# Patient Record
Sex: FEMALE
[De-identification: names, ages, dates, MRNs, and addresses within clinical notes are randomized per-mention and may not be internally consistent; named-entity substitution may affect disease eponyms.]

---

## 2020-05-16 ENCOUNTER — TRANSCRIPTION ENCOUNTER (OUTPATIENT)
Age: 60
End: 2020-05-16

## 2023-10-15 PROBLEM — Z00.00 ENCOUNTER FOR PREVENTIVE HEALTH EXAMINATION: Status: ACTIVE | Noted: 2023-10-15

## 2023-10-17 ENCOUNTER — APPOINTMENT (OUTPATIENT)
Dept: ORTHOPEDIC SURGERY | Facility: CLINIC | Age: 63
End: 2023-10-17
Payer: COMMERCIAL

## 2023-10-17 VITALS — BODY MASS INDEX: 21.69 KG/M2 | WEIGHT: 135 LBS | HEIGHT: 66 IN

## 2023-10-17 DIAGNOSIS — M76.32 ILIOTIBIAL BAND SYNDROME, LEFT LEG: ICD-10-CM

## 2023-10-17 DIAGNOSIS — M48.061 SPINAL STENOSIS, LUMBAR REGION WITHOUT NEUROGENIC CLAUDICATION: ICD-10-CM

## 2023-10-17 PROCEDURE — 73503 X-RAY EXAM HIP UNI 4/> VIEWS: CPT | Mod: LT

## 2023-10-17 PROCEDURE — 99213 OFFICE O/P EST LOW 20 MIN: CPT

## 2023-12-04 ENCOUNTER — APPOINTMENT (OUTPATIENT)
Dept: ORTHOPEDIC SURGERY | Facility: CLINIC | Age: 63
End: 2023-12-04
Payer: COMMERCIAL

## 2023-12-04 VITALS — HEIGHT: 66 IN | WEIGHT: 135 LBS | BODY MASS INDEX: 21.69 KG/M2

## 2023-12-04 DIAGNOSIS — M25.579 PAIN IN UNSPECIFIED ANKLE AND JOINTS OF UNSPECIFIED FOOT: ICD-10-CM

## 2023-12-04 DIAGNOSIS — G57.52 TARSAL TUNNEL SYNDROME, LEFT LOWER LIMB: ICD-10-CM

## 2023-12-04 PROCEDURE — 73610 X-RAY EXAM OF ANKLE: CPT | Mod: LT

## 2023-12-04 PROCEDURE — 99214 OFFICE O/P EST MOD 30 MIN: CPT

## 2023-12-19 ENCOUNTER — TRANSCRIPTION ENCOUNTER (OUTPATIENT)
Age: 63
End: 2023-12-19

## 2023-12-27 ENCOUNTER — APPOINTMENT (OUTPATIENT)
Dept: COLORECTAL SURGERY | Facility: CLINIC | Age: 63
End: 2023-12-27
Payer: COMMERCIAL

## 2023-12-27 VITALS
WEIGHT: 135 LBS | RESPIRATION RATE: 16 BRPM | HEART RATE: 86 BPM | BODY MASS INDEX: 21.69 KG/M2 | TEMPERATURE: 99 F | SYSTOLIC BLOOD PRESSURE: 152 MMHG | DIASTOLIC BLOOD PRESSURE: 90 MMHG | HEIGHT: 66 IN | OXYGEN SATURATION: 99 %

## 2023-12-27 DIAGNOSIS — Z80.0 FAMILY HISTORY OF MALIGNANT NEOPLASM OF DIGESTIVE ORGANS: ICD-10-CM

## 2023-12-27 DIAGNOSIS — Z78.9 OTHER SPECIFIED HEALTH STATUS: ICD-10-CM

## 2023-12-27 DIAGNOSIS — K64.9 UNSPECIFIED HEMORRHOIDS: ICD-10-CM

## 2023-12-27 DIAGNOSIS — K60.2 ANAL FISSURE, UNSPECIFIED: ICD-10-CM

## 2023-12-27 PROCEDURE — 46600 DIAGNOSTIC ANOSCOPY SPX: CPT

## 2023-12-27 PROCEDURE — 99204 OFFICE O/P NEW MOD 45 MIN: CPT | Mod: 25

## 2023-12-27 NOTE — PHYSICAL EXAM
[Normal Breath Sounds] : Normal breath sounds [Normal Heart Sounds] : normal heart sounds [Normal Rate and Rhythm] : normal rate and rhythm [No Rash or Lesion] : No rash or lesion [Alert] : alert [Oriented to Person] : oriented to person [Oriented to Place] : oriented to place [Oriented to Time] : oriented to time [Calm] : calm [Abdomen Masses] : No abdominal masses [Abdomen Tenderness] : ~T No ~M abdominal tenderness [Normal rectal exam] : exam was normal [Posterior] : posteriorly [Excoriation] : no perianal excoriation [Multiple Sinus Tracts] : no perianal sinus tracts [Fistula] : no fistulas [Wart] : no warts [Ulcer ___ cm] : no ulcers [Pilonidal Cyst] : no pilonidal cysts [Pilonidal Sinus] : no pilonidal sinus [Pilonidal Sinus Draining] : no pilonidal sinus drainage [Nonprolapsing] : a nonprolapsing (grade I) [Tender, Swollen] : nontender, non-swollen [Thrombosed] : that was not thrombosed [Skin Tags] : residual hemorrhoidal skin tags were noted [Normal] : was normal [JVD] : no jugular venous distention  [Wheezing] : no wheezing was heard [de-identified] : benign [de-identified] : posterior midline chronic anal fissure (small) [de-identified] : healthy, well nourished [de-identified] : NC/AT [de-identified] : supple

## 2023-12-27 NOTE — ASSESSMENT
[FreeTextEntry1] : 63-year-old female with recurrent/persistent posterior midline fissure after undergoing Botox denervation of internal sphincter as well as lateral sphincterotomy..  The patient is compliant with fiber supplementation.  I agree with her original surgeon that an examination under anesthesia possible repeat lateral internal sphincterotomy is appropriate at this time. I did caution the patient about the increased risk of incontinence.  Followup with original surgeon or scheduled with me if she chooses.

## 2023-12-27 NOTE — REVIEW OF SYSTEMS
[Arthralgias] : arthralgias [Negative] : Heme/Lymph [Abdominal Pain] : no abdominal pain [Vomiting] : no vomiting [Constipation] : no constipation [Diarrhea] : no diarrhea [Joint Swelling] : no joint swelling [Joint Stiffness] : no joint stiffness [Confused] : no confusion [Convulsions] : no convulsions [Limb Weakness] : no limb weakness [Difficulty Walking] : no difficulty walking [FreeTextEntry3] : wears eyeglasses [FreeTextEntry9] : hx spinal stenosis, LLE numbness

## 2023-12-27 NOTE — HISTORY OF PRESENT ILLNESS
[FreeTextEntry1] : 64 y/o female with history of anal fissures and hemorrhoids presents today for initial consultation.  They started about 6 years ago, and was being treated by a different physician and received laser treatments with some improvement of symptoms for several months, and then the fissure would reoccur.  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  She switched physicians at that time and started to see Dr. Richar Christina at [facility].  In June 2022- she had Botox injections, fissurotomy, but symptoms recurred several months later.  In December 2022 she underwent partial lateral sphincterotomy was done.   She continues to complain of recurrent fissures for which she was treated with Nifedipine.  She also has hx of thrombosed hemorrhoids.   She takes Colace, Metamucil, Mag Oxide, high fiber diet, stays hydrated and reports daily formed bowel movements, but despite doing all this, she feels some sphincter tightness and notices some slight bleeding on toilet tissue. After bowel movements the pain lasts for hours and she uses Sitz baths.   She was recommended to have surgery again by Dr. Lau, and is here for a second opinion.   Last colonoscopy was Oct 2021, and no polyps were removed.

## 2024-01-15 ENCOUNTER — APPOINTMENT (OUTPATIENT)
Dept: ORTHOPEDIC SURGERY | Facility: CLINIC | Age: 64
End: 2024-01-15

## 2024-10-17 ENCOUNTER — NON-APPOINTMENT (OUTPATIENT)
Age: 64
End: 2024-10-17

## 2024-10-17 ENCOUNTER — APPOINTMENT (OUTPATIENT)
Dept: SPINE | Facility: CLINIC | Age: 64
End: 2024-10-17
Payer: COMMERCIAL

## 2024-10-17 VITALS
DIASTOLIC BLOOD PRESSURE: 77 MMHG | SYSTOLIC BLOOD PRESSURE: 146 MMHG | HEART RATE: 58 BPM | HEIGHT: 66 IN | WEIGHT: 135 LBS | BODY MASS INDEX: 21.69 KG/M2 | OXYGEN SATURATION: 99 %

## 2024-10-17 DIAGNOSIS — M48.062 SPINAL STENOSIS, LUMBAR REGION WITH NEUROGENIC CLAUDICATION: ICD-10-CM

## 2024-10-17 PROCEDURE — 99203 OFFICE O/P NEW LOW 30 MIN: CPT

## 2024-10-18 ENCOUNTER — OUTPATIENT (OUTPATIENT)
Dept: OUTPATIENT SERVICES | Facility: HOSPITAL | Age: 64
LOS: 1 days | End: 2024-10-18
Payer: COMMERCIAL

## 2024-10-18 VITALS
SYSTOLIC BLOOD PRESSURE: 120 MMHG | TEMPERATURE: 98 F | WEIGHT: 134.48 LBS | RESPIRATION RATE: 16 BRPM | HEART RATE: 65 BPM | HEIGHT: 66 IN | DIASTOLIC BLOOD PRESSURE: 80 MMHG | OXYGEN SATURATION: 99 %

## 2024-10-18 DIAGNOSIS — Z98.890 OTHER SPECIFIED POSTPROCEDURAL STATES: Chronic | ICD-10-CM

## 2024-10-18 DIAGNOSIS — Z29.9 ENCOUNTER FOR PROPHYLACTIC MEASURES, UNSPECIFIED: ICD-10-CM

## 2024-10-18 DIAGNOSIS — C54.1 MALIGNANT NEOPLASM OF ENDOMETRIUM: Chronic | ICD-10-CM

## 2024-10-18 DIAGNOSIS — Z01.818 ENCOUNTER FOR OTHER PREPROCEDURAL EXAMINATION: ICD-10-CM

## 2024-10-18 DIAGNOSIS — M48.061 SPINAL STENOSIS, LUMBAR REGION WITHOUT NEUROGENIC CLAUDICATION: ICD-10-CM

## 2024-10-18 LAB
A1C WITH ESTIMATED AVERAGE GLUCOSE RESULT: 4.3 % — SIGNIFICANT CHANGE UP (ref 4–5.6)
ALBUMIN SERPL ELPH-MCNC: 4.2 G/DL — SIGNIFICANT CHANGE UP (ref 3.3–5.2)
ALP SERPL-CCNC: 64 U/L — SIGNIFICANT CHANGE UP (ref 40–120)
ALT FLD-CCNC: 11 U/L — SIGNIFICANT CHANGE UP
ANION GAP SERPL CALC-SCNC: 9 MMOL/L — SIGNIFICANT CHANGE UP (ref 5–17)
APTT BLD: 31.4 SEC — SIGNIFICANT CHANGE UP (ref 24.5–35.6)
AST SERPL-CCNC: 21 U/L — SIGNIFICANT CHANGE UP
BASOPHILS # BLD AUTO: 0.08 K/UL — SIGNIFICANT CHANGE UP (ref 0–0.2)
BASOPHILS NFR BLD AUTO: 1.1 % — SIGNIFICANT CHANGE UP (ref 0–2)
BILIRUB SERPL-MCNC: 0.4 MG/DL — SIGNIFICANT CHANGE UP (ref 0.4–2)
BLD GP AB SCN SERPL QL: SIGNIFICANT CHANGE UP
BUN SERPL-MCNC: 22.3 MG/DL — HIGH (ref 8–20)
CALCIUM SERPL-MCNC: 9.9 MG/DL — SIGNIFICANT CHANGE UP (ref 8.4–10.5)
CHLORIDE SERPL-SCNC: 100 MMOL/L — SIGNIFICANT CHANGE UP (ref 96–108)
CO2 SERPL-SCNC: 29 MMOL/L — SIGNIFICANT CHANGE UP (ref 22–29)
CREAT SERPL-MCNC: 0.67 MG/DL — SIGNIFICANT CHANGE UP (ref 0.5–1.3)
EGFR: 99 ML/MIN/1.73M2 — SIGNIFICANT CHANGE UP
EOSINOPHIL # BLD AUTO: 0.27 K/UL — SIGNIFICANT CHANGE UP (ref 0–0.5)
EOSINOPHIL NFR BLD AUTO: 3.7 % — SIGNIFICANT CHANGE UP (ref 0–6)
ESTIMATED AVERAGE GLUCOSE: 77 MG/DL — SIGNIFICANT CHANGE UP (ref 68–114)
GLUCOSE SERPL-MCNC: 83 MG/DL — SIGNIFICANT CHANGE UP (ref 70–99)
HCT VFR BLD CALC: 36.6 % — SIGNIFICANT CHANGE UP (ref 34.5–45)
HGB BLD-MCNC: 11.3 G/DL — LOW (ref 11.5–15.5)
IMM GRANULOCYTES NFR BLD AUTO: 0.3 % — SIGNIFICANT CHANGE UP (ref 0–0.9)
INR BLD: 1.13 RATIO — SIGNIFICANT CHANGE UP (ref 0.85–1.16)
LYMPHOCYTES # BLD AUTO: 1.37 K/UL — SIGNIFICANT CHANGE UP (ref 1–3.3)
LYMPHOCYTES # BLD AUTO: 18.7 % — SIGNIFICANT CHANGE UP (ref 13–44)
MCHC RBC-ENTMCNC: 29 PG — SIGNIFICANT CHANGE UP (ref 27–34)
MCHC RBC-ENTMCNC: 30.9 GM/DL — LOW (ref 32–36)
MCV RBC AUTO: 94.1 FL — SIGNIFICANT CHANGE UP (ref 80–100)
MONOCYTES # BLD AUTO: 0.54 K/UL — SIGNIFICANT CHANGE UP (ref 0–0.9)
MONOCYTES NFR BLD AUTO: 7.4 % — SIGNIFICANT CHANGE UP (ref 2–14)
MRSA PCR RESULT.: SIGNIFICANT CHANGE UP
NEUTROPHILS # BLD AUTO: 5.03 K/UL — SIGNIFICANT CHANGE UP (ref 1.8–7.4)
NEUTROPHILS NFR BLD AUTO: 68.8 % — SIGNIFICANT CHANGE UP (ref 43–77)
PLATELET # BLD AUTO: 274 K/UL — SIGNIFICANT CHANGE UP (ref 150–400)
POTASSIUM SERPL-MCNC: 5.1 MMOL/L — SIGNIFICANT CHANGE UP (ref 3.5–5.3)
POTASSIUM SERPL-SCNC: 5.1 MMOL/L — SIGNIFICANT CHANGE UP (ref 3.5–5.3)
PROT SERPL-MCNC: 6.7 G/DL — SIGNIFICANT CHANGE UP (ref 6.6–8.7)
PROTHROM AB SERPL-ACNC: 12.8 SEC — SIGNIFICANT CHANGE UP (ref 9.9–13.4)
RBC # BLD: 3.89 M/UL — SIGNIFICANT CHANGE UP (ref 3.8–5.2)
RBC # FLD: 12.6 % — SIGNIFICANT CHANGE UP (ref 10.3–14.5)
S AUREUS DNA NOSE QL NAA+PROBE: SIGNIFICANT CHANGE UP
SODIUM SERPL-SCNC: 138 MMOL/L — SIGNIFICANT CHANGE UP (ref 135–145)
WBC # BLD: 7.31 K/UL — SIGNIFICANT CHANGE UP (ref 3.8–10.5)
WBC # FLD AUTO: 7.31 K/UL — SIGNIFICANT CHANGE UP (ref 3.8–10.5)

## 2024-10-18 PROCEDURE — 85610 PROTHROMBIN TIME: CPT

## 2024-10-18 PROCEDURE — 93010 ELECTROCARDIOGRAM REPORT: CPT

## 2024-10-18 PROCEDURE — 83036 HEMOGLOBIN GLYCOSYLATED A1C: CPT

## 2024-10-18 PROCEDURE — 71046 X-RAY EXAM CHEST 2 VIEWS: CPT

## 2024-10-18 PROCEDURE — 87641 MR-STAPH DNA AMP PROBE: CPT

## 2024-10-18 PROCEDURE — G0463: CPT

## 2024-10-18 PROCEDURE — 87640 STAPH A DNA AMP PROBE: CPT

## 2024-10-18 PROCEDURE — 80053 COMPREHEN METABOLIC PANEL: CPT

## 2024-10-18 PROCEDURE — 71046 X-RAY EXAM CHEST 2 VIEWS: CPT | Mod: 26

## 2024-10-18 PROCEDURE — 36415 COLL VENOUS BLD VENIPUNCTURE: CPT

## 2024-10-18 PROCEDURE — 85730 THROMBOPLASTIN TIME PARTIAL: CPT

## 2024-10-18 PROCEDURE — 86901 BLOOD TYPING SEROLOGIC RH(D): CPT

## 2024-10-18 PROCEDURE — 93005 ELECTROCARDIOGRAM TRACING: CPT

## 2024-10-18 PROCEDURE — 86850 RBC ANTIBODY SCREEN: CPT

## 2024-10-18 PROCEDURE — 85025 COMPLETE CBC W/AUTO DIFF WBC: CPT

## 2024-10-18 PROCEDURE — 86900 BLOOD TYPING SEROLOGIC ABO: CPT

## 2024-10-18 NOTE — H&P PST ADULT - ASSESSMENT
CAPRINI SCORE    AGE RELATED RISK FACTORS                                                             [ ] Age 41-60 years                                            (1 Point)  [ ] Age: 61-74 years                                           (2 Points)                 [ ] Age= 75 years                                                (3 Points)             DISEASE RELATED RISK FACTORS                                                       [ ] Edema in the lower extremities                 (1 Point)                     [ ] Varicose veins                                               (1 Point)                                 [ ] BMI > 25 Kg/m2                                            (1 Point)                                  [ ] Serious infection (ie PNA)                            (1 Point)                     [ ] Lung disease ( COPD, Emphysema)            (1 Point)                                                                          [ ] Acute myocardial infarction                         (1 Point)                  [ ] Congestive heart failure (in the previous month)  (1 Point)         [ ] Inflammatory bowel disease                            (1 Point)                  [ ] Central venous access, PICC or Port               (2 points)       (within the last month)                                                                [ ] Stroke (in the previous month)                        (5 Points)    [ ] Previous or present malignancy                       (2 points)                                                                                                                                                         HEMATOLOGY RELATED FACTORS                                                         [ ] Prior episodes of VTE                                     (3 Points)                     [ ] Positive family history for VTE                      (3 Points)                  [ ] Prothrombin 40175 A                                     (3 Points)                     [ ] Factor V Leiden                                                (3 Points)                        [ ] Lupus anticoagulants                                      (3 Points)                                                           [ ] Anticardiolipin antibodies                              (3 Points)                                                       [ ] High homocysteine in the blood                   (3 Points)                                             [ ] Other congenital or acquired thrombophilia      (3 Points)                                                [ ] Heparin induced thrombocytopenia                  (3 Points)                                        MOBILITY RELATED FACTORS  [ ] Bed rest                                                         (1 Point)  [ ] Plaster cast                                                    (2 points)  [ ] Bed bound for more than 72 hours           (2 Points)    GENDER SPECIFIC FACTORS  [ ] Pregnancy or had a baby within the last month   (1 Point)  [ ] Post-partum < 6 weeks                                   (1 Point)  [ ] Hormonal therapy  or oral contraception   (1 Point)  [ ] History of pregnancy complications              (1 point)  [ ] Unexplained or recurrent              (1 Point)    OTHER RISK FACTORS                                           (1 Point)  [ ] BMI >40, smoking, diabetes requiring insulin, chemotherapy  blood transfusions and length of surgery over 2 hours    SURGERY RELATED RISK FACTORS  [ ]  Section within the last month     (1 Point)  [ ] Minor surgery                                                  (1 Point)  [ ] Arthroscopic surgery                                       (2 Points)  [ ] Planned major surgery lasting more            (2 Points)      than 45 minutes     [ ] Elective hip or knee joint replacement       (5 points)       surgery                                                TRAUMA RELATED RISK FACTORS  [ ] Fracture of the hip, pelvis, or leg                       (5 Points)  [ ] Spinal cord injury resulting in paralysis             (5 points)       (in the previous month)    [ ] Paralysis  (less than 1 month)                             (5 Points)  [ ] Multiple Trauma within 1 month                        (5 Points)    Total Score [        ]    Caprini Score 0-2: Low Risk, NO VTE prophylaxis required for most patients, encourage ambulation  Caprini Score 3-6: Moderate Risk , pharmacologic VTE prophylaxis is indicated for most patients (in the absence of contraindications)  Caprini Score Greater than or =7: High risk, pharmocologic VTE prophylaxis indicated for most patients (in the absence of contraindications)    OPIOID RISK TOOL    JERRY EACH BOX THAT APPLIES AND ADD TOTALS AT THE END    FAMILY HISTORY OF SUBSTANCE ABUSE                 FEMALE         MALE                                                Alcohol                             [  ]1 pt          [  ]3pts                                               Illegal Durgs                     [  ]2 pts        [  ]3pts                                               Rx Drugs                           [  ]4 pts        [  ]4 pts    PERSONAL HISTORY OF SUBSTANCE ABUSE                                                                                          Alcohol                             [  ]3 pts       [  ]3 pts                                               Illegal Drugs                     [  ]4 pts        [  ]4 pts                                               Rx Drugs                           [  ]5 pts        [  ]5 pts    AGE BETWEEN 16-45 YEARS                                      [  ]1 pt         [  ]1 pt    HISTORY OF PREADOLESCENT   SEXUAL ABUSE                                                             [  ]3 pts        [  ]0pts    PSYCHOLOGICAL DISEASE                     ADD, OCD, Bipolar, Schizophrenia        [  ]2 pts         [  ]2 pts                      Depression                                               [  ]1 pt           [  ]1 pt           SCORING TOTAL   (0)                                     A score of 3 or lower indicated LOW risk for future opioid abuse  A score of 4 to 7 indicated moderate risk for future opioid abuse  A score of 8 or higher indicates a high risk for opioid abuse                               Pt is a very pleasant 63 y/o female, PMH asthma as a child, anemia, presents to PST with c/o lumbar spondylolisthesis.  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  Has worked with PT, f/u with neuro for EMGs, acupuncture - all w/o relief.  Pt verbalizing wishes for surgical intervention, f/u with surgeon, recent imagining reviewed, sent for additional CT scan, now scheduled for surgery.  Pt reports feeling generally well otherwise, denies recent fever/cough/cold, infection or abx use.  Denies bowel/bladder dysfunction.  Scheduled for L4-L5 Posterior Lumbar Interbody Fusion on 2024 with Dr. Ac, pending medical clearance.     CAPRINI SCORE    AGE RELATED RISK FACTORS                                                             [ ] Age 41-60 years                                            (1 Point)  [ X] Age: 61-74 years                                           (2 Points)                 [ ] Age= 75 years                                                (3 Points)             DISEASE RELATED RISK FACTORS                                                       [ ] Edema in the lower extremities                 (1 Point)                     [ X] Varicose veins                                               (1 Point)                                 [ ] BMI > 25 Kg/m2                                            (1 Point)                                  [ ] Serious infection (ie PNA)                            (1 Point)                     [ ] Lung disease ( COPD, Emphysema)            (1 Point)                                                                          [ ] Acute myocardial infarction                         (1 Point)                  [ ] Congestive heart failure (in the previous month)  (1 Point)         [ ] Inflammatory bowel disease                            (1 Point)                  [ ] Central venous access, PICC or Port               (2 points)       (within the last month)                                                                [ ] Stroke (in the previous month)                        (5 Points)    [ ] Previous or present malignancy                       (2 points)                                                                                                                                                         HEMATOLOGY RELATED FACTORS                                                         [ ] Prior episodes of VTE                                     (3 Points)                     [ ] Positive family history for VTE                      (3 Points)                  [ ] Prothrombin 23186 A                                     (3 Points)                     [ ] Factor V Leiden                                                (3 Points)                        [ ] Lupus anticoagulants                                      (3 Points)                                                           [ ] Anticardiolipin antibodies                              (3 Points)                                                       [ ] High homocysteine in the blood                   (3 Points)                                             [ ] Other congenital or acquired thrombophilia      (3 Points)                                                [ ] Heparin induced thrombocytopenia                  (3 Points)                                        MOBILITY RELATED FACTORS  [ ] Bed rest                                                         (1 Point)  [ ] Plaster cast                                                    (2 points)  [ ] Bed bound for more than 72 hours           (2 Points)    GENDER SPECIFIC FACTORS  [ ] Pregnancy or had a baby within the last month   (1 Point)  [ ] Post-partum < 6 weeks                                   (1 Point)  [ ] Hormonal therapy  or oral contraception   (1 Point)  [ ] History of pregnancy complications              (1 point)  [ ] Unexplained or recurrent              (1 Point)    OTHER RISK FACTORS                                           (1 Point)  [ ] BMI >40, smoking, diabetes requiring insulin, chemotherapy  blood transfusions and length of surgery over 2 hours    SURGERY RELATED RISK FACTORS  [ ]  Section within the last month     (1 Point)  [ ] Minor surgery                                                  (1 Point)  [ ] Arthroscopic surgery                                       (2 Points)  [X ] Planned major surgery lasting more            (2 Points)      than 45 minutes     [ ] Elective hip or knee joint replacement       (5 points)       surgery                                                TRAUMA RELATED RISK FACTORS  [ ] Fracture of the hip, pelvis, or leg                       (5 Points)  [ ] Spinal cord injury resulting in paralysis             (5 points)       (in the previous month)    [ ] Paralysis  (less than 1 month)                             (5 Points)  [ ] Multiple Trauma within 1 month                        (5 Points)    Total Score [    5    ]    Caprini Score 0-2: Low Risk, NO VTE prophylaxis required for most patients, encourage ambulation  Caprini Score 3-6: Moderate Risk , pharmacologic VTE prophylaxis is indicated for most patients (in the absence of contraindications)  Caprini Score Greater than or =7: High risk, pharmocologic VTE prophylaxis indicated for most patients (in the absence of contraindications)    OPIOID RISK TOOL    JERRY EACH BOX THAT APPLIES AND ADD TOTALS AT THE END    FAMILY HISTORY OF SUBSTANCE ABUSE                 FEMALE         MALE                                                Alcohol                             [  ]1 pt          [  ]3pts                                               Illegal Durgs                     [  ]2 pts        [  ]3pts                                               Rx Drugs                           [  ]4 pts        [  ]4 pts    PERSONAL HISTORY OF SUBSTANCE ABUSE                                                                                          Alcohol                             [  ]3 pts       [  ]3 pts                                               Illegal Drugs                     [  ]4 pts        [  ]4 pts                                               Rx Drugs                           [  ]5 pts        [  ]5 pts    AGE BETWEEN 16-45 YEARS                                      [  ]1 pt         [  ]1 pt    HISTORY OF PREADOLESCENT   SEXUAL ABUSE                                                             [  ]3 pts        [  ]0pts    PSYCHOLOGICAL DISEASE                     ADD, OCD, Bipolar, Schizophrenia        [  ]2 pts         [  ]2 pts                      Depression                                               [  ]1 pt           [  ]1 pt           SCORING TOTAL   (0)                                     A score of 3 or lower indicated LOW risk for future opioid abuse  A score of 4 to 7 indicated moderate risk for future opioid abuse  A score of 8 or higher indicates a high risk for opioid abuse

## 2024-10-18 NOTE — H&P PST ADULT - PROBLEM SELECTOR PLAN 2
Total Score [        ]    Caprini Score 0-2: Low Risk, NO VTE prophylaxis required for most patients, encourage ambulation  Caprini Score 3-6: Moderate Risk , pharmacologic VTE prophylaxis is indicated for most patients (in the absence of contraindications)  Caprini Score Greater than or =7: High risk, pharmocologic VTE prophylaxis indicated for most patients (in the absence of contraindications) Total Score [   5     ]    Caprini Score 3-6: Moderate Risk , pharmacologic VTE prophylaxis is indicated for most patients (in the absence of contraindications)

## 2024-10-18 NOTE — H&P PST ADULT - HISTORY OF PRESENT ILLNESS
Pt is a pleasant 63 y/o female, PMH mild asthma, anemia, presents to PST with c/o   lumbar spondylolisthesis Mercedez explains experiencing progressively worsening b/l LE      Pt reports feeling generally well, denies recent fever/cough/cold, infection or abx use.  Scheduled for L4-L5 Posterior Lumbar Interbody Fusion on 11.4.2024 with Dr. Ac, pending medical clearance.  Pt is a pleasant 65 y/o female, PMH mild asthma, anemia, presents to PST with c/o lumbar spondylolisthesis.  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  Has f/u with PT, neuro for EMG, acupuncture all w/o relief.  Pt verbalizing wishes for surgical intervention f/u with surgeon, recent imagining reviewed, sent for additional CT scan, now scheduled for surgery.  Pt reports feeling generally well, denies recent fever/cough/cold, infection or abx use.  Scheduled for L4-L5 Posterior Lumbar Interbody Fusion on 11.4.2024 with Dr. Ac, pending medical clearance.  Pt is a very pleasant 63 y/o female, PMH asthma as a child, anemia, presents to PST with c/o lumbar spondylolisthesis.  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  Has worked with PT, f/u with neuro for EMGs, acupuncture - all w/o relief.  Pt verbalizing wishes for surgical intervention, f/u with surgeon, recent imagining reviewed, sent for additional CT scan, now scheduled for surgery.  Pt reports feeling generally well otherwise, denies recent fever/cough/cold, infection or abx use.  Denies bowel/bladder dysfunction.  Scheduled for L4-L5 Posterior Lumbar Interbody Fusion on 11.4.2024 with Dr. Ac, pending medical clearance.

## 2024-10-18 NOTE — H&P PST ADULT - MUSCULOSKELETAL COMMENTS
see HPI generalized antalgic gait noted, RLE with bandage/wrap from ankle to upper thigh from recent varicose vein ligation, dressing c/d/i, no swelling

## 2024-10-18 NOTE — H&P PST ADULT - NSICDXPASTMEDICALHX_GEN_ALL_CORE_FT
PAST MEDICAL HISTORY:  Anemia     Asthma     Displacement of lumbar intervertebral disc     Lumbar stenosis     Spondylolisthesis

## 2024-10-18 NOTE — H&P PST ADULT - PROBLEM SELECTOR PLAN 1
Total Score [        ]    Caprini Score 0-2: Low Risk, NO VTE prophylaxis required for most patients, encourage ambulation  Caprini Score 3-6: Moderate Risk , pharmacologic VTE prophylaxis is indicated for most patients (in the absence of contraindications)  Caprini Score Greater than or =7: High risk, pharmocologic VTE prophylaxis indicated for most patients (in the absence of contraindications) EKG, labs, CXR, MRSA cultures pending  Patient educated on surgical scrub, COVID testing, ERP, preadmission instructions, medical clearance and day of procedure medications, verbalizes understanding.   Pt instructed to stop vitamins/supplements/herbal medications/ASA/NSAIDS for one week prior to surgery and discuss with PMD.   Written & verbal instructions provided to pt for all education/instructions, questions encouraged/addressed, pt verbalized understandings of all education/instructions, teach back method utilized Scheduled for L4-L5 Posterior Lumbar Interbody Fusion on 11.4.2024 with Dr. Ac, pending medical clearance.   EKG, labs, CXR, MRSA cultures pending  Patient educated on surgical scrub, COVID testing, ERP, preadmission instructions, medical clearance and day of procedure medications, verbalizes understanding.   Pt instructed to stop vitamins/supplements/herbal medications/ASA/NSAIDS for one week prior to surgery and discuss with PMD.   Written & verbal instructions provided to pt for all education/instructions, questions encouraged/addressed, pt verbalized understandings of all education/instructions, teach back method utilized

## 2024-10-18 NOTE — H&P PST ADULT - MUSCULOSKELETAL
normal/ROM intact/normal gait/strength 5/5 bilateral upper extremities/strength 5/5 bilateral lower extremities details… ROM intact/strength 5/5 bilateral upper extremities/strength 5/5 bilateral lower extremities no calf tenderness/erythema/edema/ROM intact/no calf tenderness/strength 5/5 bilateral upper extremities/strength 5/5 bilateral lower extremities

## 2024-10-29 NOTE — PROVIDER CONTACT NOTE (OTHER) - ACTION/TREATMENT ORDERED:
Attended spine education class with opportunity to ask questions. Contact information for follow up questions given  on 10/23

## 2024-11-01 RX ORDER — POVIDONE-IODINE 0.07 MG/ML
1 SOLUTION TOPICAL ONCE
Refills: 0 | Status: DISCONTINUED | OUTPATIENT
Start: 2024-11-04 | End: 2024-11-04

## 2024-11-04 ENCOUNTER — INPATIENT (INPATIENT)
Facility: HOSPITAL | Age: 64
LOS: 3 days | Discharge: HOME | DRG: 402 | End: 2024-11-08
Attending: NEUROLOGICAL SURGERY | Admitting: NEUROLOGICAL SURGERY
Payer: COMMERCIAL

## 2024-11-04 VITALS
RESPIRATION RATE: 16 BRPM | DIASTOLIC BLOOD PRESSURE: 96 MMHG | TEMPERATURE: 98 F | SYSTOLIC BLOOD PRESSURE: 167 MMHG | WEIGHT: 134.92 LBS | HEART RATE: 77 BPM | HEIGHT: 66 IN | OXYGEN SATURATION: 100 %

## 2024-11-04 DIAGNOSIS — Z98.890 OTHER SPECIFIED POSTPROCEDURAL STATES: Chronic | ICD-10-CM

## 2024-11-04 DIAGNOSIS — M43.16 SPONDYLOLISTHESIS, LUMBAR REGION: ICD-10-CM

## 2024-11-04 DIAGNOSIS — M51.26 OTHER INTERVERTEBRAL DISC DISPLACEMENT, LUMBAR REGION: ICD-10-CM

## 2024-11-04 LAB
GLUCOSE BLDC GLUCOMTR-MCNC: 104 MG/DL — HIGH (ref 70–99)
GLUCOSE BLDC GLUCOMTR-MCNC: 69 MG/DL — LOW (ref 70–99)

## 2024-11-04 PROCEDURE — 99222 1ST HOSP IP/OBS MODERATE 55: CPT

## 2024-11-04 DEVICE — SPACER PROLIFT EXPAND POST 15 DEG 10X28X8-13MM: Type: IMPLANTABLE DEVICE | Status: FUNCTIONAL

## 2024-11-04 DEVICE — SCREW SERRATO POLY 7.5X50MM: Type: IMPLANTABLE DEVICE | Status: FUNCTIONAL

## 2024-11-04 DEVICE — SCREW BLOCKER BONE XIA: Type: IMPLANTABLE DEVICE | Status: FUNCTIONAL

## 2024-11-04 DEVICE — SCREW SERRATO POLY 7.5X45MM: Type: IMPLANTABLE DEVICE | Status: FUNCTIONAL

## 2024-11-04 DEVICE — SCREW POLY 6.5X50MM: Type: IMPLANTABLE DEVICE | Status: FUNCTIONAL

## 2024-11-04 DEVICE — SURGIFLO MATRIX WITH THROMBIN KIT: Type: IMPLANTABLE DEVICE | Status: FUNCTIONAL

## 2024-11-04 DEVICE — PUTTY SIGNAFUSE 15G: Type: IMPLANTABLE DEVICE | Status: FUNCTIONAL

## 2024-11-04 DEVICE — ROD RAD 45: Type: IMPLANTABLE DEVICE | Status: FUNCTIONAL

## 2024-11-04 RX ORDER — HYDROMORPHONE HCL/0.9% NACL/PF 6 MG/30 ML
0.25 PATIENT CONTROLLED ANALGESIA SYRINGE INTRAVENOUS
Refills: 0 | Status: DISCONTINUED | OUTPATIENT
Start: 2024-11-04 | End: 2024-11-04

## 2024-11-04 RX ORDER — SENNA 187 MG
2 TABLET ORAL AT BEDTIME
Refills: 0 | Status: DISCONTINUED | OUTPATIENT
Start: 2024-11-04 | End: 2024-11-08

## 2024-11-04 RX ORDER — ONDANSETRON HYDROCHLORIDE 2 MG/ML
4 INJECTION, SOLUTION INTRAMUSCULAR; INTRAVENOUS EVERY 6 HOURS
Refills: 0 | Status: DISCONTINUED | OUTPATIENT
Start: 2024-11-04 | End: 2024-11-08

## 2024-11-04 RX ORDER — CEFAZOLIN SODIUM 1 G
2000 VIAL (EA) INJECTION EVERY 8 HOURS
Refills: 0 | Status: DISCONTINUED | OUTPATIENT
Start: 2024-11-04 | End: 2024-11-07

## 2024-11-04 RX ORDER — GABAPENTIN 800 MG/1
1 TABLET, FILM COATED ORAL
Refills: 0 | DISCHARGE

## 2024-11-04 RX ORDER — POLYETHYLENE GLYCOL 3350 17 G/17G
17 POWDER, FOR SOLUTION ORAL AT BEDTIME
Refills: 0 | Status: DISCONTINUED | OUTPATIENT
Start: 2024-11-04 | End: 2024-11-08

## 2024-11-04 RX ORDER — ACETAMINOPHEN 500 MG
1000 TABLET ORAL ONCE
Refills: 0 | Status: COMPLETED | OUTPATIENT
Start: 2024-11-04 | End: 2024-11-04

## 2024-11-04 RX ORDER — POLYVINYL ALCOHOL 1 %
1 DROPS OPHTHALMIC (EYE)
Refills: 0 | DISCHARGE

## 2024-11-04 RX ORDER — CEFAZOLIN SODIUM 1 G
2000 VIAL (EA) INJECTION EVERY 8 HOURS
Refills: 0 | Status: DISCONTINUED | OUTPATIENT
Start: 2024-11-04 | End: 2024-11-04

## 2024-11-04 RX ORDER — ACETAMINOPHEN 500 MG
650 TABLET ORAL EVERY 6 HOURS
Refills: 0 | Status: DISCONTINUED | OUTPATIENT
Start: 2024-11-04 | End: 2024-11-05

## 2024-11-04 RX ORDER — CEFAZOLIN SODIUM 1 G
2000 VIAL (EA) INJECTION ONCE
Refills: 0 | Status: DISCONTINUED | OUTPATIENT
Start: 2024-11-04 | End: 2024-11-04

## 2024-11-04 RX ORDER — SODIUM CHLORIDE 9 MG/ML
3 INJECTION, SOLUTION INTRAMUSCULAR; INTRAVENOUS; SUBCUTANEOUS EVERY 8 HOURS
Refills: 0 | Status: DISCONTINUED | OUTPATIENT
Start: 2024-11-04 | End: 2024-11-04

## 2024-11-04 RX ORDER — ONDANSETRON HYDROCHLORIDE 2 MG/ML
4 INJECTION, SOLUTION INTRAMUSCULAR; INTRAVENOUS ONCE
Refills: 0 | Status: DISCONTINUED | OUTPATIENT
Start: 2024-11-04 | End: 2024-11-04

## 2024-11-04 RX ORDER — NALOXONE HYDROCHLORIDE 1 MG/ML
0.1 INJECTION, SOLUTION INTRAMUSCULAR; INTRAVENOUS; SUBCUTANEOUS
Refills: 0 | Status: DISCONTINUED | OUTPATIENT
Start: 2024-11-04 | End: 2024-11-08

## 2024-11-04 RX ORDER — HYDROMORPHONE HCL/0.9% NACL/PF 6 MG/30 ML
30 PATIENT CONTROLLED ANALGESIA SYRINGE INTRAVENOUS
Refills: 0 | Status: DISCONTINUED | OUTPATIENT
Start: 2024-11-04 | End: 2024-11-05

## 2024-11-04 RX ORDER — FENTANYL CITRAT/DEXTROSE 5%/PF 1250MCG/50
25 PATIENT CONTROLLED ANALGESIA SYRINGE INTRAVENOUS
Refills: 0 | Status: DISCONTINUED | OUTPATIENT
Start: 2024-11-04 | End: 2024-11-04

## 2024-11-04 RX ORDER — GABAPENTIN 300 MG/1
300 CAPSULE ORAL THREE TIMES A DAY
Refills: 0 | Status: DISCONTINUED | OUTPATIENT
Start: 2024-11-04 | End: 2024-11-08

## 2024-11-04 RX ORDER — GABAPENTIN 300 MG/1
300 CAPSULE ORAL THREE TIMES A DAY
Refills: 0 | Status: CANCELLED | OUTPATIENT
Start: 2024-11-05 | End: 2024-11-04

## 2024-11-04 RX ORDER — SODIUM CHLORIDE 9 MG/ML
1000 INJECTION, SOLUTION INTRAMUSCULAR; INTRAVENOUS; SUBCUTANEOUS
Refills: 0 | Status: DISCONTINUED | OUTPATIENT
Start: 2024-11-04 | End: 2024-11-08

## 2024-11-04 RX ORDER — DIPHENHYDRAMINE HCL 12.5MG/5ML
50 ELIXIR ORAL EVERY 6 HOURS
Refills: 0 | Status: DISCONTINUED | OUTPATIENT
Start: 2024-11-05 | End: 2024-11-08

## 2024-11-04 RX ORDER — ENOXAPARIN SODIUM 40MG/0.4ML
40 SYRINGE (ML) SUBCUTANEOUS EVERY 24 HOURS
Refills: 0 | Status: DISCONTINUED | OUTPATIENT
Start: 2024-11-05 | End: 2024-11-08

## 2024-11-04 RX ADMIN — Medication 0.25 MILLIGRAM(S): at 12:40

## 2024-11-04 RX ADMIN — Medication 400 MILLIGRAM(S): at 12:40

## 2024-11-04 RX ADMIN — Medication 0.25 MILLIGRAM(S): at 12:50

## 2024-11-04 RX ADMIN — Medication 2 TABLET(S): at 22:01

## 2024-11-04 RX ADMIN — POVIDONE-IODINE 1 APPLICATION(S): 0.07 SOLUTION TOPICAL at 09:41

## 2024-11-04 RX ADMIN — Medication 30 MILLILITER(S): at 20:09

## 2024-11-04 RX ADMIN — Medication 1000 MILLIGRAM(S): at 13:00

## 2024-11-04 RX ADMIN — Medication 0.25 MILLIGRAM(S): at 13:00

## 2024-11-04 RX ADMIN — Medication 30 MILLILITER(S): at 16:53

## 2024-11-04 RX ADMIN — Medication 2000 MILLIGRAM(S): at 16:57

## 2024-11-04 RX ADMIN — GABAPENTIN 300 MILLIGRAM(S): 300 CAPSULE ORAL at 16:36

## 2024-11-04 RX ADMIN — Medication 30 MILLILITER(S): at 17:30

## 2024-11-04 RX ADMIN — Medication 30 MILLILITER(S): at 13:05

## 2024-11-04 RX ADMIN — POLYETHYLENE GLYCOL 3350 17 GRAM(S): 17 POWDER, FOR SOLUTION ORAL at 22:01

## 2024-11-04 RX ADMIN — GABAPENTIN 300 MILLIGRAM(S): 300 CAPSULE ORAL at 22:01

## 2024-11-04 NOTE — CONSULT NOTE ADULT - ASSESSMENT
63 y/o female with PMH asthma as a child (last asthma attack was 20years ago), anemia, presents with c/o lumbar spondylolisthesis.  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  Had worked with PT, f/u with neuro for EMGs, acupuncture - all w/o relief.  Patient verbalized wishes for surgical intervention, f/u with surgeon, recent imagining reviewed, sent for additional CT scan.  Denied bowel/bladder dysfunction.  Patient is now s/p PLIF L4-L5 POD#0.      Lumbar Spondylolisthesis  S/p PLIF L4-L5 POD#0.  Pain control - placed on PCA.  PT/OT.  Antibiotics, wound care, drain management and DVT prophylaxis as per Neurosurgery.  Incentive spirometry.  Avoid opioid induced constipation.    DVT prophylaxis   SCDs. 63 y/o female with PMH asthma as a child (last asthma attack was 20years ago), anemia, presents with c/o lumbar spondylolisthesis.  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  Had worked with PT, f/u with neuro for EMGs, acupuncture - all w/o relief.  Patient verbalized wishes for surgical intervention, f/u with surgeon, recent imagining reviewed, sent for additional CT scan.  Denied bowel/bladder dysfunction.  Patient is now s/p PLIF L4-L5 POD#0.      Lumbar Spondylolisthesis  S/p PLIF L4-L5 POD#0.  Pain control - placed on PCA.  PT/OT.  Antibiotics per SCIP, wound care, drain management and DVT prophylaxis as per Neurosurgery.  Incentive spirometry.  Avoid opioid induced constipation.    DVT prophylaxis   SCDs.

## 2024-11-04 NOTE — CHART NOTE - NSCHARTNOTEFT_GEN_A_CORE
Ms. Hernandez was measured for and fit with an LSO. The brace was set up to fit her anatomy. I created some vacancy along the mid to distal posterior panel of the brace to prevent direct pressure on the drain. The brace should not be worn directly against the skin. It should be worn over clothing. The brace is for out of bed use when  sitting in an upright position or while standing. It is not necessary while in bed unless specified by neurosurgery. Use and care were explained. Instructions and contact info provided.     JANET Barreto  [facility]  [phone]

## 2024-11-04 NOTE — CONSULT NOTE ADULT - NS ATTEND AMEND GEN_ALL_CORE FT
64 year old female with childhood asthma, anemia and spondylolisthesis presented with bilateral lower extremity pain and underwent L4-L5 PLIF today. Patient was interviewed and examined in PACU. Pain ~ 6. Bhatia in place, emptied. Denies any dizziness, nausea, chest pain, dyspnea. Intermittent right lower extremity numbness.  PMH - as noted above  PSH - fissure, varicose vein  Allergies - multiple antibiotics (rash)  Meds - Gabapentin, Colace  FH - Mother with breast and endometrial cancer  Social History - , lives with spouse, retired PT; denies any alcohol, smoking or drug use      OBJECTIVE:  Vital Signs Last 24 Hrs  T(C): 37 (04 Nov 2024 17:27), Max: 37 (04 Nov 2024 17:27)  T(F): 98.6 (04 Nov 2024 17:27), Max: 98.6 (04 Nov 2024 17:27)  HR: 79 (04 Nov 2024 17:27) (56 - 83)  BP: 104/61 (04 Nov 2024 17:27) (96/64 - 167/96)   RR: 18 (04 Nov 2024 17:27) (10 - 18)  SpO2: 96% (04 Nov 2024 17:27) (96% - 100%)    Parameters below as of 04 Nov 2024 17:27  Patient On (Oxygen Delivery Method): room air        PHYSICAL EXAMINATION  General: petite middle aged female lying comfortably in bed  HEENT:  pupils equal, responsive, reactive to light and accomodation, extraocular movements intact  NECK: Supple  CVS:  regular rate and rhythm S1 S2  RESP:  Clear to auscultation bilaterally  GI:  Soft nondistended nontender BS(+) sluggish  : Bhatia  MSK:  Moves all extremities. Lumbar dressing with PALLAVI drain  CNS:  Awake, alert, oriented, Fluent speech., Able to lift extremities off bed. intact sensation  INTEG:  Lumbar dressing  PSYCH:  Fair mood    Hgb 11.1    # Lumbar Spondylolisthesis s/p L4-L5 PLIF  # Anemia, normocytic    Recommendations as noted above.

## 2024-11-04 NOTE — PROGRESS NOTE ADULT - SUBJECTIVE AND OBJECTIVE BOX
***incomplete****  HPI:  Pt is a very pleasant 63 y/o female, PMH asthma as a child, anemia, presents to PST with c/o lumbar spondylolisthesis.  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  Has worked with PT, f/u with neuro for EMGs, acupuncture - all w/o relief.  Pt verbalizing wishes for surgical intervention, f/u with surgeon, recent imagining reviewed, sent for additional CT scan, now scheduled for surgery.  Pt reports feeling generally well otherwise, denies recent fever/cough/cold, infection or abx use.  Denies bowel/bladder dysfunction.  Scheduled for L4-L5 Posterior Lumbar Interbody Fusion on 11.4.2024  (18 Oct 2024 08:06)          Vital Signs Last 24 Hrs  T(C): 36.4 (04 Nov 2024 12:25), Max: 36.6 (04 Nov 2024 08:43)  T(F): 97.6 (04 Nov 2024 12:25), Max: 97.9 (04 Nov 2024 08:43)  HR: 60 (04 Nov 2024 14:00) (56 - 77)  BP: 112/65 (04 Nov 2024 14:00) (96/64 - 167/96)  BP(mean): --  RR: 10 (04 Nov 2024 14:00) (10 - 17)  SpO2: 97% (04 Nov 2024 14:00) (97% - 100%)    Parameters below as of 04 Nov 2024 13:00  Patient On (Oxygen Delivery Method): room air          PHYSICAL EXAM:  GENERAL: NAD, well-groomed, well-developed  HEAD:  Atraumatic, normocephalic  DRAINS:   WOUND: Dressing clean dry intact  MENTAL STATUS: AAO x3; Awake/Comatose; Opens eyes spontaneously/to voice/to light touch/to noxious stimuli; Appropriately conversant without aphasia/Nonverbal; following simple commands/mimicking/not following commands  CRANIAL NERVES: PERRL; EOMI; corneals intact b/l; Dolls sign positive; blinks to threat b/l; no facial asymmetry; facial sensation grossly intact to light touch b/l;  tongue midline; palate rises symmetrically; gag reflex intact  MOTOR: strength 5/5 B/L upper and lower extremities; sensation grossly intact all extremities; DTRs 2+ intact and symmetric; negative Dale's b/l; negative clonus b/l  CHEST/LUNG: Clear to auscultation bilaterally; no rales, rhonchi, wheezing, or rubs  HEART: +S1/+S2; Regular rate and rhythm; no murmurs, rubs, or gallops  ABDOMEN: Soft, nontender, nondistended; bowel sounds present all four quadrants  EXTREMITIES:  2+ peripheral pulses, no clubbing, cyanosis, or edema  SKIN: Warm, dry; no rashes or lesions      LABS:      11-04 @ 07:01  -  11-04 @ 14:33  --------------------------------------------------------  IN: 140 mL / OUT: 90 mL / NET: 50 mL     POST-OPERATIVE NOTE    Procedure: L4-5 PLIF    Diagnosis/Indication: lumbar stenosis    Surgeon: Dr. Ac    INTERVAL HPI/ACUTE EVENTS:  64yFemale PMH admitted with now s/p L4-5 PLIF POD#0. Patient seen lying comfortably in bed. Still having numbness/tingling BLE. Denies CP, SOB, MOSQUEDA, calf tenderness. Pain controlled with medication.    VITALS:  T(C): 36.7 (11-04-24 @ 14:30), Max: 36.7 (11-04-24 @ 14:30)  HR: 67 (11-04-24 @ 14:30) (56 - 77)  BP: 127/66 (11-04-24 @ 14:30) (96/64 - 167/96)  RR: 12 (11-04-24 @ 14:30) (10 - 17)  SpO2: 99% (11-04-24 @ 14:30) (97% - 100%)  Wt(kg): --    PHYSICAL EXAM:  GENERAL: NAD  DRAINS: Subfascial drain to bulb suction/thumbprint suction/gravity. Serosanguinous drainage noted.  WOUND: Dressing clean dry intact  FIORELLA COMA SCORE: E- V- M- = 15  MENTAL STATUS: AAO x3; Awake; Opens eyes spontaneously; Appropriately conversant without aphasia; following simple commands  CRANIAL NERVES: PERRL. EOMI without nystagmus. Face symmetric w/ normal eye closure and smile, tongue midline. Hearing grossly intact. Speech clear. Head turning and shoulder shrug intact.   MOTOR: strength 5/5 b/l upper and lower extremities  SENSATION: grossly intact to light touch all extremities  CHEST/LUNG: Nonlabored breathing on room air  SKIN: Warm, dry; no rashes or lesions

## 2024-11-04 NOTE — PHYSICAL THERAPY INITIAL EVALUATION ADULT - CRITERIA FOR SKILLED THERAPEUTIC INTERVENTIONS
Home with assist from family as needed, surgical follow up./impairments found/functional limitations in following categories/anticipated discharge recommendation

## 2024-11-04 NOTE — PATIENT PROFILE ADULT - FALL HARM RISK - HARM RISK INTERVENTIONS
Assistance with ambulation/Assistance OOB with selected safe patient handling equipment/Communicate Risk of Fall with Harm to all staff/Discuss with provider need for PT consult/Monitor gait and stability/Provide patient with walking aids - walker, cane, crutches/Reinforce activity limits and safety measures with patient and family/Sit up slowly, dangle for a short time, stand at bedside before walking/Tailored Fall Risk Interventions/Use of alarms - bed, chair and/or voice tab/Visual Cue: Yellow wristband and red socks/Bed in lowest position, wheels locked, appropriate side rails in place/Call bell, personal items and telephone in reach/Instruct patient to call for assistance before getting out of bed or chair/Non-slip footwear when patient is out of bed/[place] to call system/Physically safe environment - no spills, clutter or unnecessary equipment/Purposeful Proactive Rounding/Room/bathroom lighting operational, light cord in reach

## 2024-11-04 NOTE — PHYSICAL THERAPY INITIAL EVALUATION ADULT - GENERAL OBSERVATIONS, REHAB EVAL
Pt received reclining in bed, bedrails x 4, CBWR, IV, tele, , BP cuff, SCDs, and PALLAVI drain intact. Pt agreeable to PT evaluation.

## 2024-11-04 NOTE — PHYSICAL THERAPY INITIAL EVALUATION ADULT - ADDITIONAL COMMENTS
Pt reports she lives with her  and adult son in a private home with 1 step with no HR + 3 steps with 2 wide-apart handrails to enter.  Inside the house there are 5-6 steps with 1 HR up to pts bedroom and bathroom, also 5-6 steps with 1 HR down to pts den.  Pt reports her  is willing and able to assist her as needed upon DC.

## 2024-11-04 NOTE — PROGRESS NOTE ADULT - ASSESSMENT
65 y/o female, PMH asthma as a child, anemia, presents to PST with c/o lumbar spondylolisthesis. POD #0 L4-L5 Posterior Lumbar Interbody Fusion.     Plan:   Pain control, avoid oversedation. PCA pump with pain management  - 1 PALLAVI to full suction  - antibiotics while drain in place  - Bhatia in, TOV tomorrow  - pain management to follow for PCA management  - LSO when OOB   65 y/o female, PMH asthma as a child, anemia, presents to PST with c/o lumbar spondylolisthesis. POD #0 L4-L5 Posterior Lumbar Interbody Fusion.     Plan:   - Pain control: PCA pump with pain management, avoid oversedation  - 1 PALLAVI to full suction  - Antibiotics while drain in place  - Bhatia in, TOV tomorrow  - LSO when OOB   - Senna/Miralax to avoid opioid induced constipation  - Benadryl PRN due to patient's adhesive irritation]  - Case discussed with Dr. Ac.

## 2024-11-04 NOTE — BRIEF OPERATIVE NOTE - NSEVIDENCEINFORABS_GEN_ALL_CORE
NOTIFICATION RETURN TO WORK / SCHOOL 
 
3/27/2017 3:16 PM 
 
Ms. Bassam Tinoco [address] [place] [address] Dr Zoie Friedman [address] [place] [id number] To Whom It May Concern: 
 
Bassam Tinoco is currently under the care of [address] [place] [address] Moshe Drake. Please excuse her from work today 3-. She will return 3-28-17. If there are questions or concerns please have the patient contact our office.  
 
 
 
Sincerely, 
 
 
Kate Jc MD 
 
                                
 

No

## 2024-11-05 DIAGNOSIS — D62 ACUTE POSTHEMORRHAGIC ANEMIA: ICD-10-CM

## 2024-11-05 DIAGNOSIS — I95.81 POSTPROCEDURAL HYPOTENSION: ICD-10-CM

## 2024-11-05 LAB
ANION GAP SERPL CALC-SCNC: 8 MMOL/L — SIGNIFICANT CHANGE UP (ref 5–17)
BASOPHILS # BLD AUTO: 0.06 K/UL — SIGNIFICANT CHANGE UP (ref 0–0.2)
BASOPHILS # BLD AUTO: 0.09 K/UL — SIGNIFICANT CHANGE UP (ref 0–0.2)
BASOPHILS NFR BLD AUTO: 0.5 % — SIGNIFICANT CHANGE UP (ref 0–2)
BASOPHILS NFR BLD AUTO: 0.8 % — SIGNIFICANT CHANGE UP (ref 0–2)
BUN SERPL-MCNC: 11 MG/DL — SIGNIFICANT CHANGE UP (ref 8–20)
CALCIUM SERPL-MCNC: 8.3 MG/DL — LOW (ref 8.4–10.5)
CHLORIDE SERPL-SCNC: 107 MMOL/L — SIGNIFICANT CHANGE UP (ref 96–108)
CO2 SERPL-SCNC: 27 MMOL/L — SIGNIFICANT CHANGE UP (ref 22–29)
CREAT SERPL-MCNC: 0.63 MG/DL — SIGNIFICANT CHANGE UP (ref 0.5–1.3)
EGFR: 100 ML/MIN/1.73M2 — SIGNIFICANT CHANGE UP
EOSINOPHIL # BLD AUTO: 0.15 K/UL — SIGNIFICANT CHANGE UP (ref 0–0.5)
EOSINOPHIL # BLD AUTO: 0.15 K/UL — SIGNIFICANT CHANGE UP (ref 0–0.5)
EOSINOPHIL NFR BLD AUTO: 1.3 % — SIGNIFICANT CHANGE UP (ref 0–6)
EOSINOPHIL NFR BLD AUTO: 1.3 % — SIGNIFICANT CHANGE UP (ref 0–6)
FERRITIN SERPL-MCNC: 46 NG/ML — SIGNIFICANT CHANGE UP (ref 13–330)
GLUCOSE SERPL-MCNC: 91 MG/DL — SIGNIFICANT CHANGE UP (ref 70–99)
HCT VFR BLD CALC: 29.5 % — LOW (ref 34.5–45)
HCT VFR BLD CALC: 31.3 % — LOW (ref 34.5–45)
HGB BLD-MCNC: 9.2 G/DL — LOW (ref 11.5–15.5)
HGB BLD-MCNC: 9.9 G/DL — LOW (ref 11.5–15.5)
IMM GRANULOCYTES NFR BLD AUTO: 0.3 % — SIGNIFICANT CHANGE UP (ref 0–0.9)
IMM GRANULOCYTES NFR BLD AUTO: 0.3 % — SIGNIFICANT CHANGE UP (ref 0–0.9)
IRON SATN MFR SERPL: 12 % — LOW (ref 14–50)
IRON SATN MFR SERPL: 34 UG/DL — LOW (ref 37–145)
LYMPHOCYTES # BLD AUTO: 17.7 % — SIGNIFICANT CHANGE UP (ref 13–44)
LYMPHOCYTES # BLD AUTO: 19.7 % — SIGNIFICANT CHANGE UP (ref 13–44)
LYMPHOCYTES # BLD AUTO: 2.07 K/UL — SIGNIFICANT CHANGE UP (ref 1–3.3)
LYMPHOCYTES # BLD AUTO: 2.32 K/UL — SIGNIFICANT CHANGE UP (ref 1–3.3)
MCHC RBC-ENTMCNC: 28.5 PG — SIGNIFICANT CHANGE UP (ref 27–34)
MCHC RBC-ENTMCNC: 29.3 PG — SIGNIFICANT CHANGE UP (ref 27–34)
MCHC RBC-ENTMCNC: 31.2 G/DL — LOW (ref 32–36)
MCHC RBC-ENTMCNC: 31.6 G/DL — LOW (ref 32–36)
MCV RBC AUTO: 91.3 FL — SIGNIFICANT CHANGE UP (ref 80–100)
MCV RBC AUTO: 92.6 FL — SIGNIFICANT CHANGE UP (ref 80–100)
MONOCYTES # BLD AUTO: 0.77 K/UL — SIGNIFICANT CHANGE UP (ref 0–0.9)
MONOCYTES # BLD AUTO: 0.88 K/UL — SIGNIFICANT CHANGE UP (ref 0–0.9)
MONOCYTES NFR BLD AUTO: 6.6 % — SIGNIFICANT CHANGE UP (ref 2–14)
MONOCYTES NFR BLD AUTO: 7.5 % — SIGNIFICANT CHANGE UP (ref 2–14)
NEUTROPHILS # BLD AUTO: 8.31 K/UL — HIGH (ref 1.8–7.4)
NEUTROPHILS # BLD AUTO: 8.59 K/UL — HIGH (ref 1.8–7.4)
NEUTROPHILS NFR BLD AUTO: 70.7 % — SIGNIFICANT CHANGE UP (ref 43–77)
NEUTROPHILS NFR BLD AUTO: 73.3 % — SIGNIFICANT CHANGE UP (ref 43–77)
PLATELET # BLD AUTO: 206 K/UL — SIGNIFICANT CHANGE UP (ref 150–400)
PLATELET # BLD AUTO: 238 K/UL — SIGNIFICANT CHANGE UP (ref 150–400)
POTASSIUM SERPL-MCNC: 3.9 MMOL/L — SIGNIFICANT CHANGE UP (ref 3.5–5.3)
POTASSIUM SERPL-SCNC: 3.9 MMOL/L — SIGNIFICANT CHANGE UP (ref 3.5–5.3)
RBC # BLD: 3.23 M/UL — LOW (ref 3.8–5.2)
RBC # BLD: 3.38 M/UL — LOW (ref 3.8–5.2)
RBC # FLD: 12.5 % — SIGNIFICANT CHANGE UP (ref 10.3–14.5)
RBC # FLD: 12.6 % — SIGNIFICANT CHANGE UP (ref 10.3–14.5)
SODIUM SERPL-SCNC: 142 MMOL/L — SIGNIFICANT CHANGE UP (ref 135–145)
TIBC SERPL-MCNC: 286 UG/DL — SIGNIFICANT CHANGE UP (ref 220–430)
TRANSFERRIN SERPL-MCNC: 200 MG/DL — SIGNIFICANT CHANGE UP (ref 192–382)
WBC # BLD: 11.71 K/UL — HIGH (ref 3.8–10.5)
WBC # BLD: 11.76 K/UL — HIGH (ref 3.8–10.5)
WBC # FLD AUTO: 11.71 K/UL — HIGH (ref 3.8–10.5)
WBC # FLD AUTO: 11.76 K/UL — HIGH (ref 3.8–10.5)

## 2024-11-05 PROCEDURE — 99233 SBSQ HOSP IP/OBS HIGH 50: CPT

## 2024-11-05 RX ORDER — METHOCARBAMOL 500 MG/1
500 TABLET ORAL THREE TIMES A DAY
Refills: 0 | Status: DISCONTINUED | OUTPATIENT
Start: 2024-11-05 | End: 2024-11-08

## 2024-11-05 RX ORDER — SODIUM CHLORIDE 9 MG/ML
500 INJECTION, SOLUTION INTRAMUSCULAR; INTRAVENOUS; SUBCUTANEOUS ONCE
Refills: 0 | Status: COMPLETED | OUTPATIENT
Start: 2024-11-05 | End: 2024-11-05

## 2024-11-05 RX ORDER — OXYCODONE HYDROCHLORIDE 30 MG/1
10 TABLET ORAL EVERY 4 HOURS
Refills: 0 | Status: DISCONTINUED | OUTPATIENT
Start: 2024-11-05 | End: 2024-11-08

## 2024-11-05 RX ORDER — HYDROMORPHONE HCL/0.9% NACL/PF 6 MG/30 ML
0.5 PATIENT CONTROLLED ANALGESIA SYRINGE INTRAVENOUS EVERY 6 HOURS
Refills: 0 | Status: DISCONTINUED | OUTPATIENT
Start: 2024-11-05 | End: 2024-11-08

## 2024-11-05 RX ORDER — OXYCODONE HYDROCHLORIDE 30 MG/1
5 TABLET ORAL EVERY 4 HOURS
Refills: 0 | Status: DISCONTINUED | OUTPATIENT
Start: 2024-11-05 | End: 2024-11-08

## 2024-11-05 RX ORDER — ACETAMINOPHEN 500 MG
650 TABLET ORAL EVERY 6 HOURS
Refills: 0 | Status: DISCONTINUED | OUTPATIENT
Start: 2024-11-05 | End: 2024-11-08

## 2024-11-05 RX ORDER — FOLIC ACID 1 MG/1
1 TABLET ORAL DAILY
Refills: 0 | Status: DISCONTINUED | OUTPATIENT
Start: 2024-11-05 | End: 2024-11-08

## 2024-11-05 RX ORDER — PSYLLIUM SEED
1 POWDER (GRAM) ORAL THREE TIMES A DAY
Refills: 0 | Status: DISCONTINUED | OUTPATIENT
Start: 2024-11-05 | End: 2024-11-06

## 2024-11-05 RX ADMIN — GABAPENTIN 300 MILLIGRAM(S): 300 CAPSULE ORAL at 13:33

## 2024-11-05 RX ADMIN — Medication 30 MILLILITER(S): at 07:44

## 2024-11-05 RX ADMIN — Medication 40 MILLIGRAM(S): at 05:28

## 2024-11-05 RX ADMIN — Medication 2000 MILLIGRAM(S): at 05:28

## 2024-11-05 RX ADMIN — SODIUM CHLORIDE 1500 MILLILITER(S): 9 INJECTION, SOLUTION INTRAMUSCULAR; INTRAVENOUS; SUBCUTANEOUS at 15:36

## 2024-11-05 RX ADMIN — SODIUM CHLORIDE 2000 MILLILITER(S): 9 INJECTION, SOLUTION INTRAMUSCULAR; INTRAVENOUS; SUBCUTANEOUS at 11:31

## 2024-11-05 RX ADMIN — Medication 2000 MILLIGRAM(S): at 21:46

## 2024-11-05 RX ADMIN — POLYETHYLENE GLYCOL 3350 17 GRAM(S): 17 POWDER, FOR SOLUTION ORAL at 21:44

## 2024-11-05 RX ADMIN — Medication 650 MILLIGRAM(S): at 11:31

## 2024-11-05 RX ADMIN — GABAPENTIN 300 MILLIGRAM(S): 300 CAPSULE ORAL at 05:28

## 2024-11-05 RX ADMIN — Medication 650 MILLIGRAM(S): at 17:43

## 2024-11-05 RX ADMIN — GABAPENTIN 300 MILLIGRAM(S): 300 CAPSULE ORAL at 21:43

## 2024-11-05 RX ADMIN — SODIUM CHLORIDE 70 MILLILITER(S): 9 INJECTION, SOLUTION INTRAMUSCULAR; INTRAVENOUS; SUBCUTANEOUS at 16:46

## 2024-11-05 RX ADMIN — Medication 2 TABLET(S): at 21:42

## 2024-11-05 RX ADMIN — Medication 650 MILLIGRAM(S): at 12:31

## 2024-11-05 RX ADMIN — Medication 650 MILLIGRAM(S): at 23:53

## 2024-11-05 RX ADMIN — Medication 1 PACKET(S): at 21:43

## 2024-11-05 RX ADMIN — Medication 2000 MILLIGRAM(S): at 13:33

## 2024-11-05 RX ADMIN — Medication 650 MILLIGRAM(S): at 18:43

## 2024-11-05 NOTE — PROGRESS NOTE ADULT - SUBJECTIVE AND OBJECTIVE BOX
HPI:  Pt is a very pleasant 63 y/o female, PMH asthma as a child, anemia, presents to PST with c/o lumbar spondylolisthesis.  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  Has worked with PT, f/u with neuro for EMGs, acupuncture - all w/o relief.  Pt verbalizing wishes for surgical intervention, f/u with surgeon, recent imagining reviewed, sent for additional CT scan, now scheduled for surgery.  Pt reports feeling generally well otherwise, denies recent fever/cough/cold, infection or abx use. Denies bowel/bladder dysfunction. Scheduled for L4-L5 Posterior Lumbar Interbody Fusion on 11.4.2024 with Dr. Ac, pending medical clearance. (18 Oct 2024 08:06)    INTERVAL HPI/OVERNIGHT EVENTS:  64F with PMH of asthma as a child (last asthma attack was 20years ago), anemia, presents with c/o lumbar spondylolisthesis, s/p L4-L5 PLIF, POD#1, seen this morning lying comfortably in bed. Tolerating diet. Bhatia in with clear urine; TOV this AM. Patient c/o incisional back pain with continued R foot/ankle numbness since pre-op. Denies headache, weakness, n/v. 24hr drain OP: 335.   Of note, nurse called reporting a vaso-vagal episode this AM after walking with PT, patient became hypotensive, however resolved once back in bed. 1 NS 500cc bolus given.    Vital Signs Last 24 Hrs  T(C): 36.9 (05 Nov 2024 10:33), Max: 37 (04 Nov 2024 17:27)  T(F): 98.4 (05 Nov 2024 10:33), Max: 98.6 (04 Nov 2024 17:27)  HR: 68 (05 Nov 2024 10:33) (56 - 83)  BP: 99/61 (05 Nov 2024 10:33) (96/64 - 127/66)  BP(mean): --  RR: 18 (05 Nov 2024 10:33) (10 - 18)  SpO2: 91% (05 Nov 2024 10:33) (91% - 100%)    Parameters below as of 05 Nov 2024 10:33  Patient On (Oxygen Delivery Method): room air    PHYSICAL EXAM:  GENERAL: NAD, well-groomed  HEAD: Atraumatic, normocephalic  WOUND: Dressing clean dry intact, within minimal dried blood, no active bleeding  DRAIN: 1 PALLAVI subfascial drain to full suction. Serosanguinous drained noted.  FIORELLA COMA SCORE: E-4 V-5 M-6 = 15  MENTAL STATUS: AAO x3; Awake; Opens eyes spontaneously; Appropriately conversant without aphasia; following commands  CRANIAL NERVES: PERRL. Facial sensation intact V1-3 distribution b/l. Face symmetric w/ normal eye closure and smile, tongue midline. Hearing grossly intact. Speech clear.  MOTOR: strength 5/5 b/l upper and lower extremities  SENSATION: grossly intact to light touch all extremities  CHEST/LUNG: Nonlabored breathing  SKIN: Warm, dry    LABS:                        9.2    11.76 )-----------( 206      ( 05 Nov 2024 06:18 )             29.5     11-05    142  |  107  |  11.0  ----------------------------<  91  3.9   |  27.0  |  0.63    Ca    8.3[L]      05 Nov 2024 06:18    Urinalysis Basic - ( 05 Nov 2024 06:18 )    Color: x / Appearance: x / SG: x / pH: x  Gluc: 91 mg/dL / Ketone: x  / Bili: x / Urobili: x   Blood: x / Protein: x / Nitrite: x   Leuk Esterase: x / RBC: x / WBC x   Sq Epi: x / Non Sq Epi: x / Bacteria: x    11-04 @ 07:01  -  11-05 @ 07:00  --------------------------------------------------------  IN: 1190 mL / OUT: 5115 mL / NET: -3925 mL    11-05 @ 07:01  -  11-05 @ 11:04  --------------------------------------------------------  IN: 0 mL / OUT: 1210 mL / NET: -1210 mL    RADIOLOGY & ADDITIONAL TESTS:  All outpatient imaging reviewed

## 2024-11-05 NOTE — PROGRESS NOTE ADULT - ASSESSMENT
63 y/o female, PMH asthma as a child, anemia, presents to PST with c/o lumbar spondylolisthesis. POD#1 s/p L4-L5 Posterior Lumbar Interbody Fusion with Dr. Ac.    Plan:   - Q4 neuro checks  - PCA pump discontinued today, transitioned to PO pain meds: Tylenol standing, oxy 5/10 PRN, IV Dilaudid PRN, Robaxin PRN   - 1 PALLAVI to full suction, monitor I&Os  - Ancef while drain in place  - TOV this AM  - LSO when OOB   - Senna/Miralax to avoid opioid induced constipation  - Benadryl PRN due to patient's adhesive irritation  - DVT ppx: SCDS, Lovenox tonight   - Activity: PT/OT  - Case discussed with Dr. Ac

## 2024-11-05 NOTE — PROGRESS NOTE ADULT - SUBJECTIVE AND OBJECTIVE BOX
CC: "for back surgery" (18 Oct 2024 08:06)    HPI:  65 y/o female with PMH asthma as a child (last asthma attack was 20years ago), anemia, presents with c/o lumbar spondylolisthesis.  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  Had worked with PT, f/u with neuro for EMGs, acupuncture - all w/o relief.  Patient verbalized wishes for surgical intervention, f/u with surgeon, recent imagining reviewed, sent for additional CT scan.  Denied bowel/bladder dysfunction.  Patient is now s/p PLIF L4-L5 POD#1.     INTERVAL HPI/OVERNIGHT EVENTS: Patient seen and examined sitting up in the bed.  Patient reports pain controlled with PCA.  Tolerated PT.  Patient denies any headache, dizziness, SOB, CP, abdominal pain, nausea, vomiting.  Other ROS reviewed and are negative.    Vital Signs Last 24 Hrs  T(C): 36.8 (05 Nov 2024 08:56), Max: 37 (04 Nov 2024 17:27)  T(F): 98.3 (05 Nov 2024 08:56), Max: 98.6 (04 Nov 2024 17:27)  HR: 76 (05 Nov 2024 08:56) (56 - 83)  BP: 100/61 (05 Nov 2024 08:56) (96/64 - 127/66)  BP(mean): --  RR: 18 (05 Nov 2024 08:56) (10 - 18)  SpO2: 93% (05 Nov 2024 08:56) (93% - 100%)    Parameters below as of 05 Nov 2024 08:56  Patient On (Oxygen Delivery Method): room air      I&O's Detail    04 Nov 2024 07:01  -  05 Nov 2024 07:00  --------------------------------------------------------  IN:    sodium chloride 0.9%: 1190 mL  Total IN: 1190 mL    OUT:    Bulb (mL): 335 mL    Drain (mL): 180 mL    Indwelling Catheter - Urethral (mL): 4600 mL  Total OUT: 5115 mL    Total NET: -3925 mL      05 Nov 2024 07:01  -  05 Nov 2024 10:31  --------------------------------------------------------  IN:  Total IN: 0 mL    OUT:    Bulb (mL): 110 mL    Indwelling Catheter - Urethral (mL): 1100 mL  Total OUT: 1210 mL    Total NET: -1210 mL      PHYSICAL EXAM:  GENERAL: NAD  HEAD:  Atraumatic, Normocephalic  NECK: Supple, No JVD, Normal thyroid  NERVOUS SYSTEM:  Alert & Oriented X3, Good concentration; Motor Strength 5/5 B/L upper and lower extremities  CHEST/LUNG: Clear to auscultation bilaterally  HEART: Regular rate and rhythm; No murmurs, rubs, or gallops  ABDOMEN: Soft, Nontender, Nondistended; Bowel sounds present; (+) neal  EXTREMITIES:  2+ Peripheral Pulses  SKIN: Lower back dressing with PALLAVI x1                            9.2    11.76 )-----------( 206      ( 05 Nov 2024 06:18 )             29.5     05 Nov 2024 06:18    142    |  107    |  11.0   ----------------------------<  91     3.9     |  27.0   |  0.63     Ca    8.3        05 Nov 2024 06:18        CAPILLARY BLOOD GLUCOSE  POCT Blood Glucose.: 104 mg/dL (04 Nov 2024 12:53)  POCT Blood Glucose.: 69 mg/dL (04 Nov 2024 12:46)      Urinalysis Basic - ( 05 Nov 2024 06:18 )    Color: x / Appearance: x / SG: x / pH: x  Gluc: 91 mg/dL / Ketone: x  / Bili: x / Urobili: x   Blood: x / Protein: x / Nitrite: x   Leuk Esterase: x / RBC: x / WBC x   Sq Epi: x / Non Sq Epi: x / Bacteria: x        MEDICATIONS  (STANDING):  ceFAZolin  Injectable. 2000 milliGRAM(s) IV Push every 8 hours  enoxaparin Injectable 40 milliGRAM(s) SubCutaneous every 24 hours  gabapentin 300 milliGRAM(s) Oral three times a day  HYDROmorphone PCA (1 mG/mL) 30 milliLiter(s) PCA Continuous PCA Continuous  polyethylene glycol 3350 17 Gram(s) Oral at bedtime  senna 2 Tablet(s) Oral at bedtime  sodium chloride 0.9%. 1000 milliLiter(s) (70 mL/Hr) IV Continuous <Continuous>    MEDICATIONS  (PRN):  acetaminophen     Tablet .. 650 milliGRAM(s) Oral every 6 hours PRN Mild Pain (1 - 3)  diphenhydrAMINE 50 milliGRAM(s) Oral every 6 hours PRN Rash and/or Itching  naloxone Injectable 0.1 milliGRAM(s) IV Push every 3 minutes PRN For ANY of the following changes in patient status:  A. RR LESS THAN 10 breaths per minute, B. Oxygen saturation LESS THAN 90%, C. Sedation score of 6  ondansetron Injectable 4 milliGRAM(s) IV Push every 6 hours PRN Nausea         CC: "for back surgery" (18 Oct 2024 08:06)    HPI:  65 y/o female with PMH asthma as a child (last asthma attack was 20years ago), anemia, presents with c/o lumbar spondylolisthesis.  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  Had worked with PT, f/u with neuro for EMGs, acupuncture - all w/o relief.  Patient verbalized wishes for surgical intervention, f/u with surgeon, recent imagining reviewed, sent for additional CT scan.  Denied bowel/bladder dysfunction.  Patient is now s/p PLIF L4-L5 POD#1.     INTERVAL HPI/OVERNIGHT EVENTS: Patient seen and examined sitting up in the bed.  Patient reports pain controlled with PCA.  Tolerated PT.  Patient denies any headache, dizziness, SOB, CP, abdominal pain, nausea, vomiting.  Other ROS reviewed and are negative.    Vital Signs Last 24 Hrs  T(C): 36.8 (05 Nov 2024 08:56), Max: 37 (04 Nov 2024 17:27)  T(F): 98.3 (05 Nov 2024 08:56), Max: 98.6 (04 Nov 2024 17:27)  HR: 76 (05 Nov 2024 08:56) (56 - 83)  BP: 100/61 (05 Nov 2024 08:56) (96/64 - 127/66)   RR: 18 (05 Nov 2024 08:56) (10 - 18)  SpO2: 93% (05 Nov 2024 08:56) (93% - 100%)    Parameters below as of 05 Nov 2024 08:56  Patient On (Oxygen Delivery Method): room air      I&O's Detail    04 Nov 2024 07:01  -  05 Nov 2024 07:00  --------------------------------------------------------  IN:    sodium chloride 0.9%: 1190 mL  Total IN: 1190 mL    OUT:    Bulb (mL): 335 mL    Drain (mL): 180 mL    Indwelling Catheter - Urethral (mL): 4600 mL  Total OUT: 5115 mL    Total NET: -3925 mL      05 Nov 2024 07:01  -  05 Nov 2024 10:31  --------------------------------------------------------  IN:  Total IN: 0 mL    OUT:    Bulb (mL): 110 mL    Indwelling Catheter - Urethral (mL): 1100 mL  Total OUT: 1210 mL    Total NET: -1210 mL      PHYSICAL EXAM:  GENERAL: NAD  HEAD:  Atraumatic, Normocephalic  NECK: Supple, No JVD, Normal thyroid  NERVOUS SYSTEM:  Alert & Oriented X3, Good concentration; Motor Strength 5/5 B/L upper and lower extremities  CHEST/LUNG: Clear to auscultation bilaterally  HEART: Regular rate and rhythm; No murmurs, rubs, or gallops  ABDOMEN: Soft, Nontender, Nondistended; Bowel sounds present; (+) neal  EXTREMITIES:  2+ Peripheral Pulses  SKIN: Lower back dressing with PALLAVI x1                            9.2    11.76 )-----------( 206      ( 05 Nov 2024 06:18 )             29.5     05 Nov 2024 06:18    142    |  107    |  11.0   ----------------------------<  91     3.9     |  27.0   |  0.63     Ca    8.3        05 Nov 2024 06:18        CAPILLARY BLOOD GLUCOSE  POCT Blood Glucose.: 104 mg/dL (04 Nov 2024 12:53)  POCT Blood Glucose.: 69 mg/dL (04 Nov 2024 12:46)      Urinalysis Basic - ( 05 Nov 2024 06:18 )    Color: x / Appearance: x / SG: x / pH: x  Gluc: 91 mg/dL / Ketone: x  / Bili: x / Urobili: x   Blood: x / Protein: x / Nitrite: x   Leuk Esterase: x / RBC: x / WBC x   Sq Epi: x / Non Sq Epi: x / Bacteria: x        MEDICATIONS  (STANDING):  ceFAZolin  Injectable. 2000 milliGRAM(s) IV Push every 8 hours  enoxaparin Injectable 40 milliGRAM(s) SubCutaneous every 24 hours  gabapentin 300 milliGRAM(s) Oral three times a day  HYDROmorphone PCA (1 mG/mL) 30 milliLiter(s) PCA Continuous PCA Continuous  polyethylene glycol 3350 17 Gram(s) Oral at bedtime  senna 2 Tablet(s) Oral at bedtime  sodium chloride 0.9%. 1000 milliLiter(s) (70 mL/Hr) IV Continuous <Continuous>    MEDICATIONS  (PRN):  acetaminophen     Tablet .. 650 milliGRAM(s) Oral every 6 hours PRN Mild Pain (1 - 3)  diphenhydrAMINE 50 milliGRAM(s) Oral every 6 hours PRN Rash and/or Itching  naloxone Injectable 0.1 milliGRAM(s) IV Push every 3 minutes PRN For ANY of the following changes in patient status:  A. RR LESS THAN 10 breaths per minute, B. Oxygen saturation LESS THAN 90%, C. Sedation score of 6  ondansetron Injectable 4 milliGRAM(s) IV Push every 6 hours PRN Nausea

## 2024-11-05 NOTE — PROGRESS NOTE ADULT - ASSESSMENT
65 y/o female with PMH asthma as a child (last asthma attack was 20years ago), anemia, presents with c/o lumbar spondylolisthesis.  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  Had worked with PT, f/u with neuro for EMGs, acupuncture - all w/o relief.  Patient verbalized wishes for surgical intervention, f/u with surgeon, recent imagining reviewed, sent for additional CT scan.  Denied bowel/bladder dysfunction.  Patient is now s/p PLIF L4-L5 POD#1.      Lumbar Spondylolisthesis  S/p PLIF L4-L5 POD#1.  Pain control - placed on PCA.  PT/OT.  Antibiotics per SCIP, wound care, drain management and DVT prophylaxis as per Neurosurgery.  Incentive spirometry.  Avoid opioid induced constipation.  Bhatia placed 11/5/24, TOV as per Neurosurgery.    Acute blood loss anemia  Expected acute blood loss post op.  Asymptomatic.  Monitor CBC.    Leukocytosis  Afebrile, asymptomatic.  Monitor CBC.    DVT prophylaxis   SCDs and Lovenox.

## 2024-11-06 ENCOUNTER — RESULT REVIEW (OUTPATIENT)
Age: 64
End: 2024-11-06

## 2024-11-06 DIAGNOSIS — R55 SYNCOPE AND COLLAPSE: ICD-10-CM

## 2024-11-06 LAB
ANION GAP SERPL CALC-SCNC: 9 MMOL/L — SIGNIFICANT CHANGE UP (ref 5–17)
BUN SERPL-MCNC: 10.8 MG/DL — SIGNIFICANT CHANGE UP (ref 8–20)
CALCIUM SERPL-MCNC: 8.2 MG/DL — LOW (ref 8.4–10.5)
CHLORIDE SERPL-SCNC: 108 MMOL/L — SIGNIFICANT CHANGE UP (ref 96–108)
CO2 SERPL-SCNC: 25 MMOL/L — SIGNIFICANT CHANGE UP (ref 22–29)
CREAT SERPL-MCNC: 0.55 MG/DL — SIGNIFICANT CHANGE UP (ref 0.5–1.3)
EGFR: 103 ML/MIN/1.73M2 — SIGNIFICANT CHANGE UP
GLUCOSE SERPL-MCNC: 93 MG/DL — SIGNIFICANT CHANGE UP (ref 70–99)
HCT VFR BLD CALC: 28.7 % — LOW (ref 34.5–45)
HGB BLD-MCNC: 9.2 G/DL — LOW (ref 11.5–15.5)
MAGNESIUM SERPL-MCNC: 1.8 MG/DL — SIGNIFICANT CHANGE UP (ref 1.6–2.6)
MCHC RBC-ENTMCNC: 29.2 PG — SIGNIFICANT CHANGE UP (ref 27–34)
MCHC RBC-ENTMCNC: 32.1 G/DL — SIGNIFICANT CHANGE UP (ref 32–36)
MCV RBC AUTO: 91.1 FL — SIGNIFICANT CHANGE UP (ref 80–100)
PHOSPHATE SERPL-MCNC: 3.1 MG/DL — SIGNIFICANT CHANGE UP (ref 2.4–4.7)
PLATELET # BLD AUTO: 175 K/UL — SIGNIFICANT CHANGE UP (ref 150–400)
POTASSIUM SERPL-MCNC: 4.3 MMOL/L — SIGNIFICANT CHANGE UP (ref 3.5–5.3)
POTASSIUM SERPL-SCNC: 4.3 MMOL/L — SIGNIFICANT CHANGE UP (ref 3.5–5.3)
RBC # BLD: 3.15 M/UL — LOW (ref 3.8–5.2)
RBC # FLD: 12.7 % — SIGNIFICANT CHANGE UP (ref 10.3–14.5)
SODIUM SERPL-SCNC: 142 MMOL/L — SIGNIFICANT CHANGE UP (ref 135–145)
WBC # BLD: 8.51 K/UL — SIGNIFICANT CHANGE UP (ref 3.8–10.5)
WBC # FLD AUTO: 8.51 K/UL — SIGNIFICANT CHANGE UP (ref 3.8–10.5)

## 2024-11-06 PROCEDURE — 93306 TTE W/DOPPLER COMPLETE: CPT | Mod: 26

## 2024-11-06 PROCEDURE — 93010 ELECTROCARDIOGRAM REPORT: CPT

## 2024-11-06 PROCEDURE — 99233 SBSQ HOSP IP/OBS HIGH 50: CPT

## 2024-11-06 PROCEDURE — 99222 1ST HOSP IP/OBS MODERATE 55: CPT

## 2024-11-06 RX ORDER — ONDANSETRON HYDROCHLORIDE 2 MG/ML
4 INJECTION, SOLUTION INTRAMUSCULAR; INTRAVENOUS ONCE
Refills: 0 | Status: COMPLETED | OUTPATIENT
Start: 2024-11-06 | End: 2024-11-07

## 2024-11-06 RX ORDER — POLYETHYLENE GLYCOL 3350 17 G/17G
17 POWDER, FOR SOLUTION ORAL ONCE
Refills: 0 | Status: COMPLETED | OUTPATIENT
Start: 2024-11-06 | End: 2024-11-06

## 2024-11-06 RX ORDER — SODIUM CHLORIDE 9 MG/ML
1000 INJECTION, SOLUTION INTRAMUSCULAR; INTRAVENOUS; SUBCUTANEOUS ONCE
Refills: 0 | Status: COMPLETED | OUTPATIENT
Start: 2024-11-06 | End: 2024-11-06

## 2024-11-06 RX ORDER — IRON SUCROSE 20 MG/ML
200 INJECTION, SOLUTION INTRAVENOUS ONCE
Refills: 0 | Status: COMPLETED | OUTPATIENT
Start: 2024-11-06 | End: 2024-11-06

## 2024-11-06 RX ADMIN — Medication 650 MILLIGRAM(S): at 06:31

## 2024-11-06 RX ADMIN — Medication 650 MILLIGRAM(S): at 00:53

## 2024-11-06 RX ADMIN — Medication 1 PACKET(S): at 05:22

## 2024-11-06 RX ADMIN — ONDANSETRON HYDROCHLORIDE 4 MILLIGRAM(S): 2 INJECTION, SOLUTION INTRAMUSCULAR; INTRAVENOUS at 13:30

## 2024-11-06 RX ADMIN — Medication 650 MILLIGRAM(S): at 18:21

## 2024-11-06 RX ADMIN — SODIUM CHLORIDE 70 MILLILITER(S): 9 INJECTION, SOLUTION INTRAMUSCULAR; INTRAVENOUS; SUBCUTANEOUS at 13:30

## 2024-11-06 RX ADMIN — Medication 650 MILLIGRAM(S): at 12:37

## 2024-11-06 RX ADMIN — IRON SUCROSE 110 MILLIGRAM(S): 20 INJECTION, SOLUTION INTRAVENOUS at 13:30

## 2024-11-06 RX ADMIN — FOLIC ACID 1 MILLIGRAM(S): 1 TABLET ORAL at 11:37

## 2024-11-06 RX ADMIN — Medication 2 TABLET(S): at 21:10

## 2024-11-06 RX ADMIN — GABAPENTIN 300 MILLIGRAM(S): 300 CAPSULE ORAL at 05:17

## 2024-11-06 RX ADMIN — Medication 40 MILLIGRAM(S): at 05:17

## 2024-11-06 RX ADMIN — Medication 2000 MILLIGRAM(S): at 21:11

## 2024-11-06 RX ADMIN — Medication 650 MILLIGRAM(S): at 05:18

## 2024-11-06 RX ADMIN — POLYETHYLENE GLYCOL 3350 17 GRAM(S): 17 POWDER, FOR SOLUTION ORAL at 21:09

## 2024-11-06 RX ADMIN — Medication 2000 MILLIGRAM(S): at 05:18

## 2024-11-06 RX ADMIN — GABAPENTIN 300 MILLIGRAM(S): 300 CAPSULE ORAL at 21:10

## 2024-11-06 RX ADMIN — Medication 650 MILLIGRAM(S): at 23:23

## 2024-11-06 RX ADMIN — GABAPENTIN 300 MILLIGRAM(S): 300 CAPSULE ORAL at 13:30

## 2024-11-06 RX ADMIN — POLYETHYLENE GLYCOL 3350 17 GRAM(S): 17 POWDER, FOR SOLUTION ORAL at 17:21

## 2024-11-06 RX ADMIN — Medication 650 MILLIGRAM(S): at 17:21

## 2024-11-06 RX ADMIN — Medication 2000 MILLIGRAM(S): at 13:30

## 2024-11-06 RX ADMIN — Medication 650 MILLIGRAM(S): at 11:37

## 2024-11-06 RX ADMIN — SODIUM CHLORIDE 1000 MILLILITER(S): 9 INJECTION, SOLUTION INTRAMUSCULAR; INTRAVENOUS; SUBCUTANEOUS at 11:37

## 2024-11-06 NOTE — CONSULT NOTE ADULT - PROBLEM SELECTOR RECOMMENDATION 9
.  - Orthostatics are negative  - Pending TTE to assess ejection fraction, WMA, and valvulopathy  - No thrill or bruit noted over bilateral carotids  - Anemia noted on lab work, most likely related to blood loss from surgery, patient is ordered IVPB iron supplementation  - Patient net negative 4.8L for LOS, receiving 1L IVF bolus  ****INCOMPLETE***** .  - Orthostatics are negative  - No thrill or bruit noted over bilateral carotids  - Anemia noted on lab work, most likely related to blood loss from surgery, patient is ordered IVPB iron supplementation  - Patient net negative 4.8L for LOS, receiving 1L IVF bolus  - Pending TTE to assess ejection fraction, WMA, and valvulopathy  - Pending US duplex of the carotids  - Place patient on continuous telemetry monitoring  - Continue with IV hydration  - Outpatient [facility] on discharge

## 2024-11-06 NOTE — OCCUPATIONAL THERAPY INITIAL EVALUATION ADULT - GENERAL OBSERVATIONS, REHAB EVAL
Left pt as received OOB in chair, NAD, +IV, +PALLAVI, +LSO on RA A&Ox4.Pre/post pain 5/10, back +c/o nausea; RN aware. Pt agreeable to OT evaluation

## 2024-11-06 NOTE — OCCUPATIONAL THERAPY INITIAL EVALUATION ADULT - ADDITIONAL COMMENTS
Pt has a walk in shower with doors and no grab bar or a tub with curtains and grab bar. Pt owns no DME. Pt is R handed and drives.

## 2024-11-06 NOTE — PROGRESS NOTE ADULT - SUBJECTIVE AND OBJECTIVE BOX
HPI:    Pt is a very pleasant 65 y/o female, PMH asthma as a child, anemia, presents to PST with c/o lumbar spondylolisthesis.  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  Has worked with PT, f/u with neuro for EMGs, acupuncture - all w/o relief.  Pt verbalizing wishes for surgical intervention, f/u with surgeon, recent imagining reviewed, sent for additional CT scan, now scheduled for surgery.  Pt reports feeling generally well otherwise, denies recent fever/cough/cold, infection or abx use.  Denies bowel/bladder dysfunction.  Scheduled for L4-L5 Posterior Lumbar Interbody Fusion on 11.4.2024 with Dr. Ac, pending medical clearance.  (18 Oct 2024 08:06)        INTERVAL HPI/OVERNIGHT EVENTS:  64y Female seen and examined at [facility]. Patient had another episode of vasovagal syncope in which she endorses did not fall/hit head. Pending orthostatic blood pressure monitoring. EKG and cardiology consulted     Vital Signs Last 24 Hrs  T(C): 37.1 (06 Nov 2024 09:00), Max: 37.7 (05 Nov 2024 20:01)  T(F): 98.7 (06 Nov 2024 09:00), Max: 99.8 (05 Nov 2024 20:01)  HR: 73 (06 Nov 2024 09:42) (69 - 78)  BP: 91/54 (06 Nov 2024 09:42) (86/50 - 113/68)  BP(mean): --  RR: 18 (06 Nov 2024 09:00) (17 - 18)  SpO2: 96% (06 Nov 2024 09:00) (91% - 96%)    Parameters below as of 06 Nov 2024 09:00  Patient On (Oxygen Delivery Method): room air      GENERAL: NAD, well-groomed  HEAD: Atraumatic, normocephalic  WOUND: Dressing clean dry intact, within minimal dried blood, no active bleeding  DRAIN: 1 PALLAVI subfascial drain to full suction. Serosanguinous drained noted.  FIORELLA COMA SCORE: E-4 V-5 M-6 = 15  MENTAL STATUS: AAO x3; Awake; Opens eyes spontaneously; Appropriately conversant without aphasia; following commands  CRANIAL NERVES: PERRL. Facial sensation intact V1-3 distribution b/l. Face symmetric w/ normal eye closure and smile, tongue midline. Hearing grossly intact. Speech clear.  MOTOR: strength 5/5 b/l upper and lower extremities  SENSATION: grossly intact to light touch all extremities  CHEST/LUNG: Nonlabored breathing  SKIN: Warm, dry  LABS:                        9.2    8.51  )-----------( 175      ( 06 Nov 2024 04:50 )             28.7     11-06    142  |  108  |  10.8  ----------------------------<  93  4.3   |  25.0  |  0.55    Ca    8.2[L]      06 Nov 2024 04:50  Phos  3.1     11-06  Mg     1.8     11-06        Urinalysis Basic - ( 06 Nov 2024 04:50 )    Color: x / Appearance: x / SG: x / pH: x  Gluc: 93 mg/dL / Ketone: x  / Bili: x / Urobili: x   Blood: x / Protein: x / Nitrite: x   Leuk Esterase: x / RBC: x / WBC x   Sq Epi: x / Non Sq Epi: x / Bacteria: x        11-05 @ 07:01  -  11-06 @ 07:00  --------------------------------------------------------  IN: 500 mL / OUT: 5365 mL / NET: -4865 mL    11-06 @ 07:01  -  11-06 @ 11:17  --------------------------------------------------------  IN: 0 mL / OUT: 750 mL / NET: -750 mL

## 2024-11-06 NOTE — CONSULT NOTE ADULT - ASSESSMENT
64F with PMHx childhood asthma, anemia, spondylolisthesis, and displaced intervertebral discs. Patient is S/P posterior lumbar interbody fusions surgery on 11/4. As per patient, yesterday after working with physical therapy 10 minutes prior, she began to feel clammy, hot, and had blurred vision. Patient was assisted to bed by the RN and felt better after lying down for a while. Patient experienced a similar episode later in the day as well that was improved with lying down in the bed. Today, patient worked with PT again and was sitting in the chair when the same symptoms came over her, clammy, hot, and blurry vision. She was assisted back to bed by the nurse and again started to feel better after lying down for a while. Endorses never losing consciousness during any of the events within the last 24-hours. As per patient, she had a similar episode 10 years ago for which she was worked up by neurology and cardiology at the time with no abnormal findings but did not continue to follow with cardiology. Patient was receiving and IV fluid bolus during bedside assessment and her EKG was NSR. Blood pressure immediately following this episode was 91/54. Patient denies headaches, CP, palpitations, SOB, or nausea. Cardiology was consulted for investigation of the syncopal episode.

## 2024-11-06 NOTE — PROGRESS NOTE ADULT - ASSESSMENT
64 year old female with childhood asthma, anemia and spondylolisthesis presented with bilateral lower extremity pain and underwent L4-L5 PLIF     # Lumbar Spondylolisthesis s/p L4-L5 PLIF (11/4/24)  # Anemia, normocytic, Acute blood loss. now Hgb stable  # Iron Deficiency  # Hypotension, Post-operative. Orthostatic    - Check orthostatics  - Neurologic monitoring  - Antibiotics as per SCIP  - Wound care and drain management as per primary team  - PT.    - Bowel regimen (added patient home medications Metamucil)  - Monitor Hgb  - IV (Venofer 200mg x 1 dose) if low stores. Added Folic Acid. Patient doesn't want to take oral Iron or MVI  - Hydration, oral and IV  - Incentive Spirometry  - VTE prophylaxis as per primary team    Discussed with Neurosurgery PA.

## 2024-11-06 NOTE — OCCUPATIONAL THERAPY INITIAL EVALUATION ADULT - VISUAL ACUITY
+glasses (present at eval); no complaints in vision offered. Central and peripheral fields intact bilaterally

## 2024-11-06 NOTE — CONSULT NOTE ADULT - SUBJECTIVE AND OBJECTIVE BOX
[facility]                                              CARDIOLOGY AT [place] [address]                                             Telephone: [phone]. Fax:[phone]                                                       CARDIOLOGY CONSULTATION NOTE                                                                                             History obtained by: Patient and medical record  Community Cardiologist: None  Reason for Consultation: Syncope  Available out pt records reviewed: Yes [ X ] No [  ]    Chief complaint: Patient is a 64y old  Female who presents with a chief complaint of L4-L5 PLIF (05 Nov 2024 11:03)    HPI: 64F with PMHx childhood asthma, anemia, spondylolisthesis, and displaced intervertebral discs. Patient is S/P posterior lumbar interbody fusions surgery on 11/4. As per patient, yesterday after working with physical therapy 10 minutes prior, she began to feel clammy, hot, and had blurred vision. Patient was assisted to bed by the RN and felt better after lying down for a while. Patient experienced a similar episode later in the day as well that was improved with lying down in the bed. Today, patient worked with PT again and was sitting in the chair when the same symptoms came over her, clammy, hot, and blurry vision. She was assisted back to bed by the nurse and again started to feel better after lying down for a while. Endorses never losing consciousness during any of the events within the last 24-hours. As per patient, she had a similar episode 10 years ago for which she was worked up by neurology and cardiology at the time with no abnormal findings but did not continue to follow with cardiology. Patient was receiving and IV fluid bolus during bedside assessment and her EKG was NSR. Blood pressure immediately following this episode was 91/54. Patient denies headaches, CP, palpitations, SOB, or nausea. Cardiology was consulted for investigation of the syncopal episode.    PAST MEDICAL HISTORY  Lumbar stenosis  Displacement of lumbar intervertebral disc  Spondylolisthesis  Anemia  Asthma    PAST SURGICAL HISTORY  Status post anal fissurectomy  H/O varicose vein ligation  Endometrial cancer    SOCIAL HISTORY:  Denies smoking/alcohol/drugs    FAMILY HISTORY:  FH: breast cancer (Mother)    HOME MEDICATIONS:  docusate: once a day (04 Nov 2024 09:08)  gabapentin 300 mg oral capsule: 1 cap(s) orally 3 times a day (04 Nov 2024 09:08)  mag, vit C, calcium / vit d: once daily (04 Nov 2024 09:08)  Refresh ophthalmic solution: 1 drop(s) in each eye once a day daily as needed (04 Nov 2024 09:08)  Tylenol 500 mg oral tablet: 2 tab(s) orally once a day as needed (04 Nov 2024 09:08)    CURRENT OTHER MEDICATIONS:  acetaminophen     Tablet .. 650 milliGRAM(s) Oral every 6 hours  diphenhydrAMINE 50 milliGRAM(s) Oral every 6 hours PRN Rash and/or Itching  gabapentin 300 milliGRAM(s) Oral three times a day  HYDROmorphone  Injectable 0.5 milliGRAM(s) IV Push every 6 hours PRN Severe Pain (7 - 10)  methocarbamol 500 milliGRAM(s) Oral three times a day PRN Muscle Spasm  ondansetron Injectable 4 milliGRAM(s) IV Push every 6 hours PRN Nausea  oxyCODONE    IR 5 milliGRAM(s) Oral every 4 hours PRN Moderate Pain (4 - 6)  oxyCODONE    IR 10 milliGRAM(s) Oral every 4 hours PRN Severe Pain (7 - 10)  polyethylene glycol 3350 17 Gram(s) Oral at bedtime  psyllium Powder 1 Packet(s) Oral three times a day  senna 2 Tablet(s) Oral at bedtime  ceFAZolin  Injectable. 2000 milliGRAM(s) IV Push every 8 hours, Stop order after: 6 Days  enoxaparin Injectable 40 milliGRAM(s) SubCutaneous every 24 hours  folic acid 1 milliGRAM(s) Oral daily  iron sucrose IVPB 200 milliGRAM(s) IV Intermittent once, Stop order after: 1 Doses  naloxone Injectable 0.1 milliGRAM(s) IV Push every 3 minutes, Stop order after: 4 Doses PRN For ANY of the following changes in patient status:  A. RR LESS THAN 10 breaths per minute, B. Oxygen saturation LESS THAN 90%, C. Sedation score of 6  sodium chloride 0.9%. 1000 milliLiter(s) (70 mL/Hr) IV Continuous <Continuous>    ALLERGIES:   meloxicam (Rash)  ciprofloxacin (Rash)  sulfa drugs (Rash)  quinolines (Rash)  Azithromycin 5 Day Dose Pack (Rash)  propofol (Rash)    REVIEW OF SYMPTOMS:   CONSTITUTIONAL: No fever, no chills, no weight loss, no weight gain, no fatigue   ENMT:  No vertigo; No sinus or throat pain  NECK: No pain or stiffness  CARDIOVASCULAR: No chest pain, no dyspnea, no syncope/presyncope, no palpitations, no dizziness, no Orthopnea, no Paroxsymal nocturnal dyspnea  RESPIRATORY: no Shortness of breath, no cough, no wheezing  : No dysuria, no hematuria   GI: No dark color stool, no nausea, no diarrhea, no constipation, no abdominal pain   NEURO: No headache, no slurred speech   MUSCULOSKELETAL: +back pain; No joint pain or swelling; No muscle or extremity pain  PSYCH: No agitation, no anxiety.    ALL OTHER REVIEW OF SYSTEMS ARE NEGATIVE.    VITAL SIGNS:  T(C): 37.1 (11-06-24 @ 09:00), Max: 37.7 (11-05-24 @ 20:01)  T(F): 98.7 (11-06-24 @ 09:00), Max: 99.8 (11-05-24 @ 20:01)  HR: 73 (11-06-24 @ 09:42) (69 - 78)  BP: 91/54 (11-06-24 @ 09:42) (86/50 - 113/68)  RR: 18 (11-06-24 @ 09:00) (17 - 18)  SpO2: 96% (11-06-24 @ 09:00) (91% - 96%)    Orthostatic VS  11-06-24 @ 09:42  Lying BP: 100/62 HR: 72  Sitting BP: 100/61 HR: 76  Standing BP: 108/68 HR: 80  Site: upper left arm  Mode: electronic      INTAKE AND OUTPUT:  11-05 @ 07:01  -  11-06 @ 07:00  --------------------------------------------------------  IN: 500 mL / OUT: 5365 mL / NET: -4865 mL    11-06 @ 07:01  -  11-06 @ 13:13  --------------------------------------------------------  IN: 0 mL / OUT: 1650 mL / NET: -1650 mL    PHYSICAL EXAM:  Constitutional: Comfortable . No acute distress.   HEENT: Atraumatic and normocephalic , neck is supple . no JVD. No carotid bruit.  CNS: A&Ox3. No focal deficits.   Respiratory: CTAB, unlabored   Cardiovascular: RRR normal s1 s2. No murmur. No rubs or gallop.  Gastrointestinal: Soft, non-tender. +Bowel sounds.   Extremities: 2+ Peripheral Pulses, No clubbing, cyanosis, or edema  Psychiatric: Calm . no agitation.   Skin: +pallor; Warm and dry, no ulcers on extremities     LABS:             9.2    8.51  )-----------( 175      ( 06 Nov 2024 04:50 )             28.7    142  |  108  |  10.8  ----------------------------<  93  4.3   |  25.0  |  0.55    Ca    8.2[L]      06 Nov 2024 04:50  Phos  3.1     11-06  Mg     1.8     11-06    Urinalysis Basic - ( 06 Nov 2024 04:50 )  Color: x / Appearance: x / SG: x / pH: x  Gluc: 93 mg/dL / Ketone: x  / Bili: x / Urobili: x   Blood: x / Protein: x / Nitrite: x   Leuk Esterase: x / RBC: x / WBC x   Sq Epi: x / Non Sq Epi: x / Bacteria: x    ECG: NSR

## 2024-11-06 NOTE — PROGRESS NOTE ADULT - ASSESSMENT
63 y/o female, PMH asthma as a child, anemia, presents to PST with c/o lumbar spondylolisthesis. POD#2 s/p L4-L5 Posterior Lumbar Interbody Fusion with Dr. Ac.    Plan:   - Q4 neuro checks, transition to Telemetry 2/2 vasovagal events.   Pain well controlled with pain regimen:   Tylenol 650q6 standing, oxy 5/10 PRN, IV Dilaudid PRN, Robaxin PRN  - Cardiology consulted for Syncope workup.    - 1 PALLAVI to full suction, monitor I&Os  - Ancef while drain in place  - Voiding   - LSO when OOB   - Senna/Miralax to avoid opioid induced constipation  - Benadryl PRN due to patient's adhesive irritation  - DVT ppx: SCDS, Lovenox  - Activity: PT/OT  - Case discussed with Dr. Ac

## 2024-11-06 NOTE — CONSULT NOTE ADULT - NS ATTEND AMEND GEN_ALL_CORE FT
65 y/o F with PMH childhood asthma, anemia, spondylolisthesis, admitted for L4-5 posterior lumbar interbody fusion 11/4/24. Cardiology consulted due to near syncope. Patient reports 2 episodes of diaphoresis and blurred vision that occurred after working with PT since yesterday; symptoms improved after resting for a few minutes. Reports a similar episode 10 years ago; underwent cardiac workup with no significant findings.   -BP borderline following this episode. Also with decrease in H/H 11.3/36.6 -> 9.2/29.5 following surgery. Symptoms likely related to anemia and borderline BP. Recommend IVF hydration, iron supplementation. Can consider PRBC transfusion for hgb <9  -TTE   -Telemetry monitoring while inpatient   -Carotid dopplers   -Outpatient MCOT if symptoms persist

## 2024-11-06 NOTE — OCCUPATIONAL THERAPY INITIAL EVALUATION ADULT - PHYSICAL ASSIST/NONPHYSICAL ASSIST:DRESS LOWER BODY, OT EVAL
pt deferred LB devices, reports she may have hip kit at home but spouse will assist with task as needed which she prefers

## 2024-11-06 NOTE — OCCUPATIONAL THERAPY INITIAL EVALUATION ADULT - DIAGNOSIS, OT EVAL
64 year old Female with PMH asthma as a child, anemia, presents to PST with c/o lumbar spondylolisthesis. POD#2 s/p L4-L5 Posterior Lumbar Interbody Fusion with Dr. Ac (11/4)

## 2024-11-06 NOTE — PROGRESS NOTE ADULT - SUBJECTIVE AND OBJECTIVE BOX
HOSPITALIST PROGRESS NOTE    IQRA FAJARDO  [id number]  64yFemale    Patient is a 64y old  Female who presents with a chief complaint of L4-L5 PLIF (05 Nov 2024 11:03)      SUBJECTIVE:   Chart reviewed since last visit.   Patient seen and examined at bedside for lumbar stenosis, anemia, hypotension  Patient states she felt good this morning and then participated with PT and then sat in chair when she had similar episode of dizziness and nausea and went back to bed.  Pain controlled; denies any cough, fever, chills, chest pain, dyspnea      OBJECTIVE:  Vital Signs Last 24 Hrs  T(C): 37.1 (06 Nov 2024 09:00), Max: 37.7 (05 Nov 2024 20:01)  T(F): 98.7 (06 Nov 2024 09:00), Max: 99.8 (05 Nov 2024 20:01)  HR: 73 (06 Nov 2024 09:42) (69 - 78)  BP: 91/54 (06 Nov 2024 09:42) (86/50 - 113/68)   RR: 18 (06 Nov 2024 09:00) (17 - 18)  SpO2: 96% (06 Nov 2024 09:00) (91% - 96%)    Parameters below as of 06 Nov 2024 09:00  Patient On (Oxygen Delivery Method): room air        PHYSICAL EXAMINATION  General: petite middle aged female lying comfortably in bed  HEENT:  pupils equal, responsive, reactive to light and accomodation, extraocular movements intact  NECK: Supple  CVS:  regular rate and rhythm S1 S2  RESP:  Clear to auscultation bilaterally  GI:  Soft nondistended nontender BS(+)    : Bhatia  MSK:  Moves all extremities though limited in lower extremities due to pain. Lumbar dressing with PALLAVI drain  CNS:  Awake, alert, oriented, Fluent speech., Able to lift extremities off bed. intact sensation  INTEG:  Lumbar dressing  PSYCH:  Fair mood      MONITOR:  CAPILLARY BLOOD GLUCOSE            I&O's Summary    05 Nov 2024 07:01  -  06 Nov 2024 07:00  --------------------------------------------------------  IN: 500 mL / OUT: 5365 mL / NET: -4865 mL    06 Nov 2024 07:01  -  06 Nov 2024 11:15  --------------------------------------------------------  IN: 0 mL / OUT: 750 mL / NET: -750 mL                            9.2    8.51  )-----------( 175      ( 06 Nov 2024 04:50 )             28.7       11-06    142  |  108  |  10.8  ----------------------------<  93  4.3   |  25.0  |  0.55    Ca    8.2[L]      06 Nov 2024 04:50  Phos  3.1     11-06  Mg     1.8     11-06          Urinalysis Basic - ( 06 Nov 2024 04:50 )    Color: x / Appearance: x / SG: x / pH: x  Gluc: 93 mg/dL / Ketone: x  / Bili: x / Urobili: x   Blood: x / Protein: x / Nitrite: x   Leuk Esterase: x / RBC: x / WBC x   Sq Epi: x / Non Sq Epi: x / Bacteria: x            TTE:    RADIOLOGY        MEDICATIONS  (STANDING):  acetaminophen     Tablet .. 650 milliGRAM(s) Oral every 6 hours  ceFAZolin  Injectable. 2000 milliGRAM(s) IV Push every 8 hours  enoxaparin Injectable 40 milliGRAM(s) SubCutaneous every 24 hours  folic acid 1 milliGRAM(s) Oral daily  gabapentin 300 milliGRAM(s) Oral three times a day  iron sucrose IVPB 200 milliGRAM(s) IV Intermittent once  polyethylene glycol 3350 17 Gram(s) Oral at bedtime  psyllium Powder 1 Packet(s) Oral three times a day  senna 2 Tablet(s) Oral at bedtime  sodium chloride 0.9% Bolus 1000 milliLiter(s) IV Bolus once  sodium chloride 0.9%. 1000 milliLiter(s) (70 mL/Hr) IV Continuous <Continuous>      MEDICATIONS  (PRN):  diphenhydrAMINE 50 milliGRAM(s) Oral every 6 hours PRN Rash and/or Itching  HYDROmorphone  Injectable 0.5 milliGRAM(s) IV Push every 6 hours PRN Severe Pain (7 - 10)  methocarbamol 500 milliGRAM(s) Oral three times a day PRN Muscle Spasm  naloxone Injectable 0.1 milliGRAM(s) IV Push every 3 minutes PRN For ANY of the following changes in patient status:  A. RR LESS THAN 10 breaths per minute, B. Oxygen saturation LESS THAN 90%, C. Sedation score of 6  ondansetron Injectable 4 milliGRAM(s) IV Push every 6 hours PRN Nausea  oxyCODONE    IR 5 milliGRAM(s) Oral every 4 hours PRN Moderate Pain (4 - 6)  oxyCODONE    IR 10 milliGRAM(s) Oral every 4 hours PRN Severe Pain (7 - 10)

## 2024-11-06 NOTE — OCCUPATIONAL THERAPY INITIAL EVALUATION ADULT - LIVES WITH, PROFILE
Pt lives in a house with her spouse and 26 year old son, family can assist with needs. 1 step no handrail + 3 steps with handrail to enter, 5-6 steps inside with handrail up to bed/bath or down to den./children/spouse

## 2024-11-07 ENCOUNTER — TRANSCRIPTION ENCOUNTER (OUTPATIENT)
Age: 64
End: 2024-11-07

## 2024-11-07 DIAGNOSIS — R55 SYNCOPE AND COLLAPSE: ICD-10-CM

## 2024-11-07 DIAGNOSIS — K59.00 CONSTIPATION, UNSPECIFIED: ICD-10-CM

## 2024-11-07 LAB
ANION GAP SERPL CALC-SCNC: 7 MMOL/L — SIGNIFICANT CHANGE UP (ref 5–17)
BUN SERPL-MCNC: 6.6 MG/DL — LOW (ref 8–20)
CALCIUM SERPL-MCNC: 8.3 MG/DL — LOW (ref 8.4–10.5)
CHLORIDE SERPL-SCNC: 107 MMOL/L — SIGNIFICANT CHANGE UP (ref 96–108)
CO2 SERPL-SCNC: 28 MMOL/L — SIGNIFICANT CHANGE UP (ref 22–29)
CREAT SERPL-MCNC: 0.5 MG/DL — SIGNIFICANT CHANGE UP (ref 0.5–1.3)
EGFR: 105 ML/MIN/1.73M2 — SIGNIFICANT CHANGE UP
GLUCOSE SERPL-MCNC: 97 MG/DL — SIGNIFICANT CHANGE UP (ref 70–99)
HCT VFR BLD CALC: 27.4 % — LOW (ref 34.5–45)
HGB BLD-MCNC: 8.7 G/DL — LOW (ref 11.5–15.5)
MAGNESIUM SERPL-MCNC: 1.8 MG/DL — SIGNIFICANT CHANGE UP (ref 1.8–2.6)
MCHC RBC-ENTMCNC: 28.9 PG — SIGNIFICANT CHANGE UP (ref 27–34)
MCHC RBC-ENTMCNC: 31.8 G/DL — LOW (ref 32–36)
MCV RBC AUTO: 91 FL — SIGNIFICANT CHANGE UP (ref 80–100)
PHOSPHATE SERPL-MCNC: 2.9 MG/DL — SIGNIFICANT CHANGE UP (ref 2.4–4.7)
PLATELET # BLD AUTO: 175 K/UL — SIGNIFICANT CHANGE UP (ref 150–400)
POTASSIUM SERPL-MCNC: 4 MMOL/L — SIGNIFICANT CHANGE UP (ref 3.5–5.3)
POTASSIUM SERPL-SCNC: 4 MMOL/L — SIGNIFICANT CHANGE UP (ref 3.5–5.3)
RBC # BLD: 3.01 M/UL — LOW (ref 3.8–5.2)
RBC # FLD: 12.8 % — SIGNIFICANT CHANGE UP (ref 10.3–14.5)
SODIUM SERPL-SCNC: 142 MMOL/L — SIGNIFICANT CHANGE UP (ref 135–145)
WBC # BLD: 7.42 K/UL — SIGNIFICANT CHANGE UP (ref 3.8–10.5)
WBC # FLD AUTO: 7.42 K/UL — SIGNIFICANT CHANGE UP (ref 3.8–10.5)

## 2024-11-07 PROCEDURE — 99233 SBSQ HOSP IP/OBS HIGH 50: CPT

## 2024-11-07 PROCEDURE — 99232 SBSQ HOSP IP/OBS MODERATE 35: CPT

## 2024-11-07 PROCEDURE — 93880 EXTRACRANIAL BILAT STUDY: CPT | Mod: 26

## 2024-11-07 RX ORDER — MAGNESIUM HYDROXIDE 1200 MG/15ML
30 SUSPENSION ORAL ONCE
Refills: 0 | Status: COMPLETED | OUTPATIENT
Start: 2024-11-07 | End: 2024-11-07

## 2024-11-07 RX ORDER — IRON SUCROSE 20 MG/ML
200 INJECTION, SOLUTION INTRAVENOUS ONCE
Refills: 0 | Status: COMPLETED | OUTPATIENT
Start: 2024-11-07 | End: 2024-11-07

## 2024-11-07 RX ADMIN — Medication 650 MILLIGRAM(S): at 17:13

## 2024-11-07 RX ADMIN — Medication 650 MILLIGRAM(S): at 12:35

## 2024-11-07 RX ADMIN — Medication 650 MILLIGRAM(S): at 05:27

## 2024-11-07 RX ADMIN — GABAPENTIN 300 MILLIGRAM(S): 300 CAPSULE ORAL at 05:27

## 2024-11-07 RX ADMIN — Medication 650 MILLIGRAM(S): at 23:21

## 2024-11-07 RX ADMIN — METHOCARBAMOL 500 MILLIGRAM(S): 500 TABLET ORAL at 23:23

## 2024-11-07 RX ADMIN — Medication 650 MILLIGRAM(S): at 19:30

## 2024-11-07 RX ADMIN — Medication 650 MILLIGRAM(S): at 06:27

## 2024-11-07 RX ADMIN — SODIUM CHLORIDE 70 MILLILITER(S): 9 INJECTION, SOLUTION INTRAMUSCULAR; INTRAVENOUS; SUBCUTANEOUS at 05:39

## 2024-11-07 RX ADMIN — MAGNESIUM HYDROXIDE 30 MILLILITER(S): 1200 SUSPENSION ORAL at 13:33

## 2024-11-07 RX ADMIN — Medication 40 MILLIGRAM(S): at 05:27

## 2024-11-07 RX ADMIN — Medication 650 MILLIGRAM(S): at 00:23

## 2024-11-07 RX ADMIN — GABAPENTIN 300 MILLIGRAM(S): 300 CAPSULE ORAL at 21:16

## 2024-11-07 RX ADMIN — ONDANSETRON HYDROCHLORIDE 4 MILLIGRAM(S): 2 INJECTION, SOLUTION INTRAMUSCULAR; INTRAVENOUS at 05:35

## 2024-11-07 RX ADMIN — METHOCARBAMOL 500 MILLIGRAM(S): 500 TABLET ORAL at 16:19

## 2024-11-07 RX ADMIN — Medication 650 MILLIGRAM(S): at 11:35

## 2024-11-07 RX ADMIN — METHOCARBAMOL 500 MILLIGRAM(S): 500 TABLET ORAL at 10:17

## 2024-11-07 RX ADMIN — Medication 2000 MILLIGRAM(S): at 05:25

## 2024-11-07 RX ADMIN — IRON SUCROSE 110 MILLIGRAM(S): 20 INJECTION, SOLUTION INTRAVENOUS at 13:33

## 2024-11-07 RX ADMIN — SODIUM CHLORIDE 70 MILLILITER(S): 9 INJECTION, SOLUTION INTRAMUSCULAR; INTRAVENOUS; SUBCUTANEOUS at 21:16

## 2024-11-07 RX ADMIN — FOLIC ACID 1 MILLIGRAM(S): 1 TABLET ORAL at 11:36

## 2024-11-07 NOTE — PROGRESS NOTE ADULT - SUBJECTIVE AND OBJECTIVE BOX
[facility]                                                         CARDIOLOGY AT [facility]                                                                  [address], [facility] [place] [address]                                                         Telephone: [phone]. Fax:[phone]                                                                             PROGRESS NOTE    Reason for follow up: Near syncope  Telemetry  NSR  no arrhythmias  Feeling back to normal  pain controlled with Tylenol  HGB 8.7  minimal drainage in PALLAVI    Review of symptoms:   Cardiac:  No chest pain. No dyspnea. No palpitations.  Respiratory: no cough. No dyspnea  Gastrointestinal: No diarrhea. No abdominal pain. No bleeding.   Neuro: No focal neuro complaints.      Vitals:  T(C): 36.9 (11-07-24 @ 04:27), Max: 37.3 (11-07-24 @ 00:06)  HR: 66 (11-07-24 @ 04:27) (66 - 79)  BP: 108/68 (11-07-24 @ 04:27) (91/54 - 115/66)  RR: 19 (11-07-24 @ 04:27) (18 - 19)  SpO2: 95% (11-07-24 @ 04:27) (93% - 98%)  Wt(kg): --  I&O's Summary    06 Nov 2024 07:01  -  07 Nov 2024 07:00  --------------------------------------------------------  IN: 1440 mL / OUT: 3440 mL / NET: -2000 mL      Weight (kg): 61.2 (11-04 @ 08:43)      PHYSICAL EXAM:  Appearance: Sitting in chair with back brace in nad  HEENT:  Atraumatic. Normocephalic.  Normal oral mucosa  Neurologic: A & O x 3, no gross focal deficits.  Cardiovascular: RRR S1 S2, No murmur, no rubs/gallops. No JVD  Respiratory: Lungs clear to auscultation, unlabored   Gastrointestinal:  Soft, Non-tender, + BS  Lower Extremities: No edema  Psychiatry: Patient is calm. No agitation.   Skin: warm and dry.      CURRENT MEDICATIONS:  MEDICATIONS  (STANDING):  acetaminophen     Tablet .. 650 milliGRAM(s) Oral every 6 hours  ceFAZolin  Injectable. 2000 milliGRAM(s) IV Push every 8 hours  enoxaparin Injectable 40 milliGRAM(s) SubCutaneous every 24 hours  folic acid 1 milliGRAM(s) Oral daily  gabapentin 300 milliGRAM(s) Oral three times a day  polyethylene glycol 3350 17 Gram(s) Oral at bedtime  senna 2 Tablet(s) Oral at bedtime  sodium chloride 0.9%. 1000 milliLiter(s) (70 mL/Hr) IV Continuous <Continuous>      LABS:	 	                            8.7    7.42  )-----------( 175      ( 07 Nov 2024 04:55 )             27.4     11-07    142  |  107  |  6.6[L]  ----------------------------<  97  4.0   |  28.0  |  0.50    Ca    8.3[L]      07 Nov 2024 04:55  Phos  2.9     11-07  Mg     1.8     11-07      DIAGNOSTIC TESTING:  [ ] Echocardiogram: < from: TTE W or WO Ultrasound Enhancing Agent (11.06.24 @ 16:37) >   1. Left ventricular cavity is normal in size. Left ventricular wall thickness is normal. Left ventricular systolic function is normal with an ejection fraction visually estimated at 60 to 65 %.   2. Normal left ventricular diastolic function.   3. Normal right ventricular cavity size and normal right ventricular systolic function.   4. Left atrium is normal in size.   5. The right atrium is normal in size.   6. Trace mitral regurgitation.   7. Trileaflet aortic valve with normal systolic excursion. There is mild calcification of the aortic valve leaflets.   8. Mild tricuspid regurgitation.   9. No pericardial effusion seen.  10. Estimated pulmonary artery systolic pressure is 19 mmHg, consistent with normal pulmonary artery pressure.

## 2024-11-07 NOTE — DISCHARGE NOTE PROVIDER - NSDCCPTREATMENT_GEN_ALL_CORE_FT
PRINCIPAL PROCEDURE  Procedure: PLIF, with instrumentation  Findings and Treatment: L4-L5     PRINCIPAL PROCEDURE  Procedure: PLIF, with instrumentation  Findings and Treatment: L4-L5 PLIF

## 2024-11-07 NOTE — DISCHARGE NOTE PROVIDER - NSDCFUADDINST_GEN_ALL_CORE_FT
Diet: Please hold aspirin, NSAIDs, goody's powder, anticoagulation, vitamin E, turmeric/mon lattes, cinnamon pills, fish oil, ginseng, and all other supplements until cleared by your neurosurgeon on an outpatient follow up appointment    Pain: It is common to have incisional pain after surgery. You may take the pain medication we prescribe, or over the counter Tylenol. Pain medication, especially narcotics, can cause constipation. Use stool softeners or mild laxative as needed. Do not exceed 4000mg of tylenol in 24 hours.     Medication: You have been started on a new medication called _________    Activity: Avoid straining, heavy lifting (objects greater than 10 pounds), strenuous activity, twisting, bending, and vigorous exercise. You should not drive or operate machinery until cleared by your neurosurgeon.     Brace: wear back brace when out of bed    Wound: Monitor your incision for drainage, redness, swelling. Call our office if you have any concerns. You may shower. While washing, do not rub, scratch, or scrub your incision. You may wash your incision with mild shampoo/soap. Keep the incision open to air. However, if you want to cover the incision, you may, with a clean scarf or hat.     Appointment(s): Follow up with Dr. Ac in our office outpatient in 1-2 weeks. Sutures/staples will be removed at follow up appointment. It is also important to see your primary physician to keep them up to date regarding your recent admission and for a formal outpatient comprehensive medical review. Call their offices for an appointment as soon as you leave the hospital. If you do not have a primary physician, you may contact the [facility] at [place] [phone] located on [address] [place] [address].    Should you develop any new or worsening neurologic symptoms or have concern about your incision, please call our office immediately or visit the emergency department.    Return to ER immediately for any of the following: fever, bleeding, new onset numbness/tingling/weakness, nausea and/or vomiting, chest pain, shortness of breath, confusion, seizure, altered mental status, urinary and/or fecal incontinence or retention.  Diet: Please hold aspirin, NSAIDs, goody's powder, anticoagulation, vitamin E, turmeric/mon lattes, cinnamon pills, fish oil, ginseng, and all other supplements until cleared by your neurosurgeon on an outpatient follow up appointment    Pain: It is common to have incisional pain after surgery. You may take the pain medication we prescribe, or over the counter Tylenol. Pain medication, especially narcotics, can cause constipation. Use stool softeners or mild laxative as needed. Do not exceed 4000mg of tylenol in 24 hours.    Activity: Avoid straining, heavy lifting (objects greater than 10 pounds), strenuous activity, twisting, bending, and vigorous exercise. You should not drive or operate machinery until cleared by your neurosurgeon.     Brace: wear back brace when out of bed    Wound: Monitor your incision for drainage, redness, swelling. Call our office if you have any concerns. You may shower. While washing, do not rub, scratch, or scrub your incision. You may wash your incision with mild shampoo/soap. Keep the incision open to air. However, if you want to cover the incision, you may, with a clean scarf or hat.     Appointment(s): Follow up with Dr. Ac in our office outpatient in 1-2 weeks. Sutures/staples will be removed at follow up appointment. It is also important to see your primary physician to keep them up to date regarding your recent admission and for a formal outpatient comprehensive medical review. Call their offices for an appointment as soon as you leave the hospital. If you do not have a primary physician, you may contact the [facility] at [place] [phone] located on [address] [place] [address].    Should you develop any new or worsening neurologic symptoms or have concern about your incision, please call our office immediately or visit the emergency department.    Return to ER immediately for any of the following: fever, bleeding, new onset numbness/tingling/weakness, nausea and/or vomiting, chest pain, shortness of breath, confusion, seizure, altered mental status, urinary and/or fecal incontinence or retention.  Diet: Please continue your regular diet. Please avoid NSAIDs (ibuprofen, naproxen) until cleared by your neurosurgeon.    Pain: It is common to have incisional pain after surgery. You may take the pain medication we prescribe, or over the counter Tylenol. Pain medication, especially narcotics, can cause constipation. Use stool softeners or mild laxative as needed. Do not exceed 4000mg of tylenol in 24 hours.    Medicine: You have been started on a new medication, Robaxin. Robaxin helps treat muscle spasms. Some common side effects of robaxin are dry mouth, headache, blurred vision, drowsiness, dizziness, fatigue, diarrhea or constipation.  If you experience any of these side effects please let your doctor know.  You have been started on a new medication, Oxycodone.  Oxycodone helps to control pain. Oxycodone is an additive medication so it is important to limit the use of this medication.  Side effects include nausea, vomiting, constipation, dry mouth, lightheadedness, dizziness or drowsiness.  It is important to tell your physician if you experience any of these side effects. Avoid taking this medication within 1 hour of taking Robaxin.     Activity: Avoid straining, heavy lifting (objects greater than 10 pounds), strenuous activity, twisting, bending, and vigorous exercise. You should not drive or operate machinery until cleared by your neurosurgeon.     Brace: wear back brace when out of bed    Wound: Monitor your incision for drainage, redness, swelling. Call our office if you have any concerns. You may shower. While washing, do not rub, scratch, or scrub your incision. You may wash your incision with mild shampoo/soap. Keep the incision open to air.     Appointment(s): Follow up with Dr. Ac in our office outpatient in 1 week. Sutures/staples will be removed at follow up appointment. It is also important to see your primary physician to keep them up to date regarding your recent admission and for a formal outpatient comprehensive medical review. Call their offices for an appointment as soon as you leave the hospital. If you do not have a primary physician, you may contact the [facility] at [place] [phone] located on [address] [place] [address], [place] [address].    Should you develop any new or worsening neurologic symptoms or have concern about your incision, please call our office immediately or visit the emergency department.    Return to ER immediately for any of the following: fever, bleeding, new onset numbness/tingling/weakness, nausea and/or vomiting, chest pain, shortness of breath, confusion, seizure, altered mental status, urinary and/or fecal incontinence or retention.

## 2024-11-07 NOTE — PROGRESS NOTE ADULT - ASSESSMENT
64F with PMHx Asthma  anemia, spondylolisthesis, and displaced intervertebral discs who is now S/P posterior lumbar interbody fusions surgery on 11/4. Yesterday after working with physical therapy 10 minutes prior, she began to feel clammy, hot, and had blurred vision. Patient was assisted to bed by the RN and felt better after lying down for a while. Patient experienced a similar episode later in the day as well that was improved with lying down in the bed. Today, patient worked with PT again and was sitting in the chair when the same symptoms came over her, clammy, hot, and blurry vision. She was assisted back to bed by the nurse and again started to feel better after lying down for a while. Endorses never losing consciousness during any of the events within the last 24-hours. As per patient, she had a similar episode 10 years ago for which she was worked up by neurology and cardiology at the time with no abnormal findings but did not continue to follow with cardiology. Patient was receiving and IV fluid bolus during bedside assessment and her EKG was NSR. Blood pressure immediately following this episode was 91/54. Patient denies headaches, CP, palpitations, SOB, or nausea. Cardiology was consulted for investigation of the syncopal episode.  Ekg nsr wnl   Telemetry no arrhythmias

## 2024-11-07 NOTE — DISCHARGE NOTE PROVIDER - NSDCFUADDAPPT_GEN_ALL_CORE_FT
Please schedule a followup with your primary care provider in the next 1-2 weeks to be re-evaluated for your anemia Please schedule a followup with your primary care provider in the next 1-2 weeks to be re-evaluated for your anemia  Please also schedule a followup with a cardiologist given your episodes of near vasovagal syncope post operatively. Our cardiology team recommends a consideration for Mobile Cardiac Outpatient Telemetry (MCOT) on an outpatient basis.

## 2024-11-07 NOTE — PROGRESS NOTE ADULT - PROBLEM SELECTOR PLAN 1
Multifactorial due to soft b/p and low blood volume  Sitting up and feeling well today  CM No arrhythmias  HGB still drifting down consider a transfusion  Echo reviewed and negative  Carotid doppler pending if negative we will sign off  patient to follow up with us for MCOT

## 2024-11-07 NOTE — DISCHARGE NOTE PROVIDER - PROVIDER TOKENS
PROVIDER:[TOKEN:[[id number]],FOLLOWUP:[2 weeks]] PROVIDER:[TOKEN:[[id number]],FOLLOWUP:[1 week]]

## 2024-11-07 NOTE — PROGRESS NOTE ADULT - ASSESSMENT
64 year old female with childhood asthma, anemia and spondylolisthesis presented with bilateral lower extremity pain and underwent L4-L5 PLIF     # Lumbar Spondylolisthesis s/p L4-L5 PLIF (11/4/24); Complicated by  # Anemia, normocytic, Acute blood loss. Mild downwards trend due to hemodilution from fluid for hypotension  # Iron Deficiency  # Hypotension, Post-operative. Orthostatics improved with hydration  # Constipation    - Orthostatics  - Neurologic monitoring  - Antibiotics as per SCIP  - Wound care and drain management as per primary team  - PT.    - Bowel regimen (Senna, Miralax and home Metamucil). Add Bisacodyl suppository daily PRN  - Monitor Hgb; PRBC if <7  - IV (Venofer 200mg x another dose today) as patient doesn't want to take oral Iron or MVI. Folic Acid.   - Hydration, oral and IV  - Incentive Spirometry  - VTE prophylaxis as per primary team    Discussed with Neurosurgery PA.

## 2024-11-07 NOTE — PROGRESS NOTE ADULT - SUBJECTIVE AND OBJECTIVE BOX
HOSPITALIST PROGRESS NOTE    IQRA FAJARDO  [id number]  64yFemale    Patient is a 64y old  Female who presents with a chief complaint of elective spine surgery (07 Nov 2024 10:29)      SUBJECTIVE:   Chart reviewed since last visit.   Patient seen and examined at bedside for spinal stenosis, anemia, iron deficiency and hypotension.  Feels better today  No dizziness, palpitations, chest pain, dyspnea  Nausea last night, no vomiting  Voiding but no bowel movement yet      OBJECTIVE:  Vital Signs Last 24 Hrs  T(C): 37.1 (07 Nov 2024 09:00), Max: 37.3 (07 Nov 2024 00:06)  T(F): 98.7 (07 Nov 2024 09:00), Max: 99.2 (07 Nov 2024 00:06)  HR: 76 (07 Nov 2024 09:00) (66 - 79)  BP: 119/76 (07 Nov 2024 09:00) (107/67 - 119/76)   RR: 18 (07 Nov 2024 09:00) (18 - 19)  SpO2: 98% (07 Nov 2024 09:00) (93% - 98%)    Parameters below as of 07 Nov 2024 09:00  Patient On (Oxygen Delivery Method): room air    PHYSICAL EXAMINATION  General: petite middle aged female lying comfortably in bed  HEENT:  pupils equal, responsive, reactive to light and accomodation, extraocular movements intact  NECK: Supple  CVS:  regular rate and rhythm S1 S2  RESP:  Clear to auscultation bilaterally  GI:  Soft nondistended nontender BS(+)    : Bhatia  MSK:  Moves all extremities though limited in lower extremities due to pain. Lumbar dressing    CNS:  Awake, alert, oriented, Fluent speech., Able to lift extremities off bed. intact sensation  INTEG:  Lumbar dressing  PSYCH:  Fair mood       MONITOR:  CAPILLARY BLOOD GLUCOSE            I&O's Summary    06 Nov 2024 07:01  -  07 Nov 2024 07:00  --------------------------------------------------------  IN: 1440 mL / OUT: 3440 mL / NET: -2000 mL                            8.7    7.42  )-----------( 175      ( 07 Nov 2024 04:55 )             27.4       11-07    142  |  107  |  6.6[L]  ----------------------------<  97  4.0   |  28.0  |  0.50    Ca    8.3[L]      07 Nov 2024 04:55  Phos  2.9     11-07  Mg     1.8     11-07          Urinalysis Basic - ( 07 Nov 2024 04:55 )    Color: x / Appearance: x / SG: x / pH: x  Gluc: 97 mg/dL / Ketone: x  / Bili: x / Urobili: x   Blood: x / Protein: x / Nitrite: x   Leuk Esterase: x / RBC: x / WBC x   Sq Epi: x / Non Sq Epi: x / Bacteria: x            TTE:    RADIOLOGY        MEDICATIONS  (STANDING):  acetaminophen     Tablet .. 650 milliGRAM(s) Oral every 6 hours  ceFAZolin  Injectable. 2000 milliGRAM(s) IV Push every 8 hours  enoxaparin Injectable 40 milliGRAM(s) SubCutaneous every 24 hours  folic acid 1 milliGRAM(s) Oral daily  gabapentin 300 milliGRAM(s) Oral three times a day  magnesium hydroxide Suspension 30 milliLiter(s) Oral once  polyethylene glycol 3350 17 Gram(s) Oral at bedtime  senna 2 Tablet(s) Oral at bedtime  sodium chloride 0.9%. 1000 milliLiter(s) (70 mL/Hr) IV Continuous <Continuous>      MEDICATIONS  (PRN):  bisacodyl Suppository 10 milliGRAM(s) Rectal daily PRN Constipation  diphenhydrAMINE 50 milliGRAM(s) Oral every 6 hours PRN Rash and/or Itching  HYDROmorphone  Injectable 0.5 milliGRAM(s) IV Push every 6 hours PRN Severe Pain (7 - 10)  methocarbamol 500 milliGRAM(s) Oral three times a day PRN Muscle Spasm  naloxone Injectable 0.1 milliGRAM(s) IV Push every 3 minutes PRN For ANY of the following changes in patient status:  A. RR LESS THAN 10 breaths per minute, B. Oxygen saturation LESS THAN 90%, C. Sedation score of 6  ondansetron Injectable 4 milliGRAM(s) IV Push every 6 hours PRN Nausea  oxyCODONE    IR 5 milliGRAM(s) Oral every 4 hours PRN Moderate Pain (4 - 6)  oxyCODONE    IR 10 milliGRAM(s) Oral every 4 hours PRN Severe Pain (7 - 10)

## 2024-11-07 NOTE — DISCHARGE NOTE PROVIDER - NSDCCPCAREPLAN_GEN_ALL_CORE_FT
PRINCIPAL DISCHARGE DIAGNOSIS  Diagnosis: Spondylolisthesis, lumbar region  Assessment and Plan of Treatment:       SECONDARY DISCHARGE DIAGNOSES  Diagnosis: Acute blood loss anemia  Assessment and Plan of Treatment:     Diagnosis: Lumbar stenosis  Assessment and Plan of Treatment:

## 2024-11-07 NOTE — PROGRESS NOTE ADULT - SUBJECTIVE AND OBJECTIVE BOX
HPI:  Pt is a very pleasant 63 y/o female, PMH asthma as a child, anemia, presents to PST with c/o lumbar spondylolisthesis.  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  Has worked with PT, f/u with neuro for EMGs, acupuncture - all w/o relief.  Pt verbalizing wishes for surgical intervention, f/u with surgeon, recent imagining reviewed, sent for additional CT scan, now scheduled for surgery.  Pt reports feeling generally well otherwise, denies recent fever/cough/cold, infection or abx use.  Denies bowel/bladder dysfunction.  Scheduled for L4-L5 Posterior Lumbar Interbody Fusion on 11.4.2024 with Dr. Ac, pending medical clearance.  (18 Oct 2024 08:06)    INTERVAL HPI/OVERNIGHT EVENTS:  64y Female s/p __ seen lying comfortably in bed. Tolerating diet. Passing gas/BM. Voiding. Bhatia in with __ clear urine. Denies headache, weakness, numbness, n/v/d, fevers, chills, chest pain, SOB.     Vital Signs Last 24 Hrs  T(C): 37.1 (07 Nov 2024 09:00), Max: 37.3 (07 Nov 2024 00:06)  T(F): 98.7 (07 Nov 2024 09:00), Max: 99.2 (07 Nov 2024 00:06)  HR: 76 (07 Nov 2024 09:00) (66 - 79)  BP: 119/76 (07 Nov 2024 09:00) (107/67 - 119/76)  BP(mean): --  RR: 18 (07 Nov 2024 09:00) (18 - 19)  SpO2: 98% (07 Nov 2024 09:00) (93% - 98%)    Parameters below as of 07 Nov 2024 09:00  Patient On (Oxygen Delivery Method): room air    PHYSICAL EXAM:  GENERAL: NAD, well-groomed  HEAD:  Atraumatic, normocephalic  DRAINS:   WOUND: Dressing clean dry intact  FIORELLA COMA SCORE: E- V- M- =       E: 4= opens eyes spontaneously 3= to voice 2= to noxious 1= no opening       V: 5= oriented 4= confused 3= inappropriate words 2= incomprehensible sounds 1= nonverbal 1T= intubated       M: 6= follows commands 5= localizes 4= withdraws 3= flexor posturing 2= extensor posturing 1= no movement  MENTAL STATUS: AAO x3; Awake/Comatose; Opens eyes spontaneously/to voice/to light touch/to noxious stimuli; Appropriately conversant without aphasia/Nonverbal; following simple commands/mimicking/not following commands  CRANIAL NERVES: Visual acuity normal for age, visual fields full to confrontation, PERRL. EOMI without nystagmus. Facial sensation intact V1-3 distribution b/l. Face symmetric w/ normal eye closure and smile, tongue midline. Hearing grossly intact. Speech clear. Head turning and shoulder shrug intact.   REFLEXES: PERRL. Corneals intact b/l. Gag intact. Cough intact. Oculocephalic reflex intact (Doll's eye). Negative Dale's b/l. Negative clonus b/l  MOTOR: strength 5/5 b/l upper and lower extremities  Uppers     Delt (C5/6)     Bicep (C5/6)     Wrist Extend (C6)     Tricep (C7)     HG (C8/T1)  R                     5/5                 5/5                         5/5                           5/5                   5/5  L                      5/5                 5/5                         5/5                           5/5                   5/5  Lowers      HF(L1/L2)     KE (L3)     DF (L4)     EHL (L5)     PF (S1)      R                     5/5              5/5           5/5           5/5            5/5  L                     5/5               5/5          5/5            5/5            5/5  SENSATION: grossly intact to light touch all extremities  COORDINATION: Gait intact; rapid alternating movements intact; heel to shin intact; no upper extremity dysmetria  CHEST/LUNG: Clear to auscultation bilaterally; no rales, rhonchi, wheezing, or rubs  HEART: +S1/+S2; Regular rate and rhythm; no murmurs, rubs, or gallops  ABDOMEN: Soft, nontender, nondistended; bowel sounds present all four quadrants  EXTREMITIES:  2+ peripheral pulses, no clubbing, cyanosis, or edema  SKIN: Warm, dry; no rashes or lesions    LABS:                        8.7    7.42  )-----------( 175      ( 07 Nov 2024 04:55 )             27.4     11-07    142  |  107  |  6.6[L]  ----------------------------<  97  4.0   |  28.0  |  0.50    Ca    8.3[L]      07 Nov 2024 04:55  Phos  2.9     11-07  Mg     1.8     11-07        Urinalysis Basic - ( 07 Nov 2024 04:55 )    Color: x / Appearance: x / SG: x / pH: x  Gluc: 97 mg/dL / Ketone: x  / Bili: x / Urobili: x   Blood: x / Protein: x / Nitrite: x   Leuk Esterase: x / RBC: x / WBC x   Sq Epi: x / Non Sq Epi: x / Bacteria: x        11-06 @ 07:01  -  11-07 @ 07:00  --------------------------------------------------------  IN: 1440 mL / OUT: 3440 mL / NET: -2000 mL        RADIOLOGY & ADDITIONAL TESTS:  < from: TTE W or WO Ultrasound Enhancing Agent (11.06.24 @ 16:37) >  CONCLUSIONS:      1. Left ventricular cavity is normal in size. Left ventricular wall thickness is normal. Left ventricular systolic function is normal with an ejection fraction visually estimated at 60 to 65 %.   2. Normal left ventricular diastolic function.   3. Normal right ventricular cavity size and normal right ventricular systolic function.   4. Left atrium is normal in size.   5. The right atrium is normal in size.   6. Trace mitral regurgitation.   7. Trileaflet aortic valve with normal systolic excursion. There is mild calcification of the aortic valve leaflets.   8. Mild tricuspid regurgitation.   9. No pericardial effusion seen.  10. Estimated pulmonary artery systolic pressure is 19 mmHg, consistent with normal pulmonary artery pressure.    < end of copied text >   HPI:  Pt is a very pleasant 63 y/o female, PMH asthma as a child, anemia, presents to PST with c/o lumbar spondylolisthesis.  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  Has worked with PT, f/u with neuro for EMGs, acupuncture - all w/o relief.  Pt verbalizing wishes for surgical intervention, f/u with surgeon, recent imagining reviewed, sent for additional CT scan, now scheduled for surgery.  Pt reports feeling generally well otherwise, denies recent fever/cough/cold, infection or abx use.  Denies bowel/bladder dysfunction.  Scheduled for L4-L5 Posterior Lumbar Interbody Fusion on 11.4.2024 with Dr. Ac, pending medical clearance.  (18 Oct 2024 08:06)    INTERVAL HPI/OVERNIGHT EVENTS:  64y Female seen sitting in bedside chair. Tolerating diet. Denies headache, weakness, numbness, n/v/d, fevers, chills, chest pain, SOB.     Vital Signs Last 24 Hrs  T(C): 37.1 (07 Nov 2024 09:00), Max: 37.3 (07 Nov 2024 00:06)  T(F): 98.7 (07 Nov 2024 09:00), Max: 99.2 (07 Nov 2024 00:06)  HR: 76 (07 Nov 2024 09:00) (66 - 79)  BP: 119/76 (07 Nov 2024 09:00) (107/67 - 119/76)  BP(mean): --  RR: 18 (07 Nov 2024 09:00) (18 - 19)  SpO2: 98% (07 Nov 2024 09:00) (93% - 98%)    Parameters below as of 07 Nov 2024 09:00  Patient On (Oxygen Delivery Method): room air    PHYSICAL EXAM:  GENERAL: NAD, well-groomed  HEAD: Atraumatic, normocephalic  WOUND: Dressing clean dry intact, within minimal dried blood, no active bleeding -- some erythema and open wound at top of tegaderm dressing  DRAIN: 1 PALLAVI subfascial drain to full suction. Serosanguinous drained noted.  FIORELLA COMA SCORE: E-4 V-5 M-6 = 15  MENTAL STATUS: AAO x3; Awake; Opens eyes spontaneously; Appropriately conversant without aphasia; following commands  CRANIAL NERVES: PERRL. Facial sensation intact V1-3 distribution b/l. Face symmetric w/ normal eye closure and smile, tongue midline. Hearing grossly intact. Speech clear.  MOTOR: strength 5/5 b/l upper and lower extremities  SENSATION: grossly intact to light touch all extremities  CHEST/LUNG: Nonlabored breathing  SKIN: Warm, dry      LABS:                        8.7    7.42  )-----------( 175      ( 07 Nov 2024 04:55 )             27.4     11-07    142  |  107  |  6.6[L]  ----------------------------<  97  4.0   |  28.0  |  0.50    Ca    8.3[L]      07 Nov 2024 04:55  Phos  2.9     11-07  Mg     1.8     11-07        Urinalysis Basic - ( 07 Nov 2024 04:55 )    Color: x / Appearance: x / SG: x / pH: x  Gluc: 97 mg/dL / Ketone: x  / Bili: x / Urobili: x   Blood: x / Protein: x / Nitrite: x   Leuk Esterase: x / RBC: x / WBC x   Sq Epi: x / Non Sq Epi: x / Bacteria: x        11-06 @ 07:01  -  11-07 @ 07:00  --------------------------------------------------------  IN: 1440 mL / OUT: 3440 mL / NET: -2000 mL        RADIOLOGY & ADDITIONAL TESTS:  < from: TTE W or WO Ultrasound Enhancing Agent (11.06.24 @ 16:37) >  CONCLUSIONS:      1. Left ventricular cavity is normal in size. Left ventricular wall thickness is normal. Left ventricular systolic function is normal with an ejection fraction visually estimated at 60 to 65 %.   2. Normal left ventricular diastolic function.   3. Normal right ventricular cavity size and normal right ventricular systolic function.   4. Left atrium is normal in size.   5. The right atrium is normal in size.   6. Trace mitral regurgitation.   7. Trileaflet aortic valve with normal systolic excursion. There is mild calcification of the aortic valve leaflets.   8. Mild tricuspid regurgitation.   9. No pericardial effusion seen.  10. Estimated pulmonary artery systolic pressure is 19 mmHg, consistent with normal pulmonary artery pressure.    < end of copied text >

## 2024-11-07 NOTE — PROGRESS NOTE ADULT - NS ATTEND AMEND GEN_ALL_CORE FT
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 Felt good this morning, got up with PT, later sat in chair and then felt blurry, dizziness, nauseous.  Pain in back 4/10 at rest, worse with movement. Passed flatus, still with Bhatia.  Denies any fever, chills, dyspnea, chest pain, vomiting, parethesia or paresis     Parameters below as of 04 Nov 2024 17:27  Patient On (Oxygen Delivery Method): room air        PHYSICAL EXAMINATION  General: petite middle aged female lying comfortably in bed  HEENT:  pupils equal, responsive, reactive to light and accomodation, extraocular movements intact  NECK: Supple  CVS:  regular rate and rhythm S1 S2  RESP:  Clear to auscultation bilaterally  GI:  Soft nondistended nontender BS(+)    : Bhatia  MSK:  Moves all extremities though limited in lower extremities due to pain. Lumbar dressing with PALLAVI drain  CNS:  Awake, alert, oriented, Fluent speech., Able to lift extremities off bed. intact sensation  INTEG:  Lumbar dressing  PSYCH:  Fair mood    Hgb 11.1 --> 9.3    # Lumbar Spondylolisthesis s/p L4-L5 PLIF  # Anemia, normocytic, Acute blood loss  # Hypotension, Post-operative    - Neurologic monitoring, Antibiotics as per SCIP, Wound care and drain management as per primary team, Incentive Spirometry, VTE prophylaxis as per primary team, PT. Remove Bhatia. Bowel regimen (added patient home medications Metamucil)  - Monitor Hgb, check iron and supplement IV (Venofer 200mg x 1 dose) if low stores. Add Folic Acid. Patient doesn't want to take oral Iron or MVI  - Hydration.      Discussed with Neurosurgery PA
-      64F hx anemia, spondylolisthesis, and displaced intervertebral discs who is now S/P posterior lumbar interbody fusions surgery on 11/4. Yesterday after working with physical therapy 10 minutes prior, she began to feel clammy, hot, and had blurred vision. Patient was assisted to bed by the RN and felt better after lying down for a while. Patient experienced a similar episode later in the day as well that was improved with lying down in the bed. Today, patient worked with PT again and was sitting in the chair when the same symptoms came over her, clammy, hot, and blurry vision. She was assisted back to bed by the nurse and again started to feel better after lying down for a while. Endorses never losing consciousness during any of the events within the last 24-hours. As per patient, she had a similar episode 10 years ago for which she was worked up by neurology and cardiology at the time with no abnormal findings but did not continue to follow with cardiology. Patient was receiving and IV fluid bolus during bedside assessment and her EKG was NSR. Blood pressure immediately following this episode was 91/54.       Near syncope.   - Pt has h/o syncope long time ago with prodromal symptoms of seating and feeling lightheaded. She had similar prodromal during this time also.  - Multifactorial due to soft b/p and low blood volume  - Sitting up and feeling well today  - telemetry: No arrhythmias  - Check CBC, transfuse as needed  - Avoid dehydration, lifestyle modification d/w pt inlenght  - TTE reviewed, no sign abnl  - Carotid doppler pending if negative we will sign off  - OP Cardio FU for [facility]OT.

## 2024-11-07 NOTE — PROGRESS NOTE ADULT - ASSESSMENT
65 y/o female, PMH asthma as a child, anemia, presents to PST with c/o lumbar spondylolisthesis. POD#2 s/p L4-L5 Posterior Lumbar Interbody Fusion with Dr. Ac.    PLAN:  - Q4 neuro checks, transition to Telemetry 2/2 vasovagal events.   - Pain well controlled with pain regimen: Tylenol 650q6 standing, oxy 5/10 PRN, IV Dilaudid PRN, Robaxin PRN  - Cardiology consulted for Syncope workup: ECHO negative, carotid US pending, will f/u OP for [facility]OT  - 1 PALLAVI to full suction, monitor I&Os  - Ancef while drain in place   - LSO when OOB   - Senna/Miralax to avoid opioid induced constipation  - Benadryl PRN due to patient's adhesive irritation  - DVT ppx: SCDS, Lovenox  - Activity: PT/OT  - Case discussed with Dr. Ac 63 y/o female, PMH asthma as a child, anemia, presents to PST with c/o lumbar spondylolisthesis. POD#2 s/p L4-L5 Posterior Lumbar Interbody Fusion with Dr. Ac.    PLAN:  - Q4 neuro checks, Telemetry 2/2 vasovagal events.   - Pain well controlled with pain regimen: Tylenol 650q6 standing, oxy 5/10 PRN, IV Dilaudid PRN, Robaxin PRN  - Cardiology consulted for Syncope workup: ECHO negative, carotid US pending, will f/u OP for [facility]OT  - 1 PALLAVI to full suction, monitor I&Os  - Ancef while drain in place   - LSO when OOB   - Dressing removed today, steri strips to remain on; redressed open wound from tegaderm irritation w/xeroform/paper tape  - Senna/Miralax to avoid opioid induced constipation, will add milk of magnesia per patient's request  - Benadryl PRN due to patient's adhesive irritation  - DVT ppx: SCDS, Lovenox  - Activity: PT/OT  - Case discussed with Dr. Ac

## 2024-11-07 NOTE — DISCHARGE NOTE PROVIDER - NSDCACTIVITY_GEN_ALL_CORE
Do not drive or operate machinery/Do not make important decisions/Stairs allowed/Walking - Indoors allowed/No heavy lifting/straining/Walking - Outdoors allowed/Follow Instructions Provided by your Surgical Team/Activity as tolerated

## 2024-11-07 NOTE — DISCHARGE NOTE PROVIDER - HOSPITAL COURSE
63 y/o Female with PMHx of  ashtma and anemia admitted to hospital on 11/4/2024 for L4-L5 PLIF with Dr. Ac. Patient tolerated procedure well and progressed appropriately. Currently tolerating regular diet, voiding independently, having bowel movements and ambulating. Neurosurgery, Cardiology, medicine, physical therapy, occupational therapy  followed the patient's course. On DATE, pt deemed medically and surgically stable for discharge. Discharged with home medications, incentive spirometer and pain medications to follow-up with Dr. Ac. Instructions, medications, and hospital course was discussed with patient and/or family prior to discharge. 65 y/o Female with PMHx of  ashtma and anemia admitted to hospital on 11/4/2024 for L4-L5 PLIF with Dr. Ac. Patient tolerated procedure well and progressed appropriately. Currently tolerating regular diet, voiding independently, having bowel movements and ambulating. Neurosurgery, Cardiology, medicine, physical therapy, occupational therapy  followed the patient's course. On 11/8/24, pt deemed medically and surgically stable for discharge. Discharged with home medications, incentive spirometer and pain medications to follow-up with Dr. Ac. Instructions, medications, and hospital course was discussed with patient and/or family prior to discharge. 63 y/o Female with PMHx of  ashtma and anemia admitted to hospital on 11/4/2024 for L4-L5 PLIF with Dr. Ac. Patient tolerated procedure well and progressed appropriately. Currently tolerating regular diet, voiding independently, having bowel movements and ambulating. Neurosurgery, Cardiology, medicine, physical therapy, occupational therapy  followed the patient's course. Patient did have several episodes of near vasovagal syncope post op, was evaluated by cardiology with TTE WNL and negative b/l carotid dopplers. Pt recommended for cardiology f/u as an outpatient for [facility]OT. On 11/8/24, pt deemed medically and surgically stable for discharge. Discharged with home medications, incentive spirometer and pain medications to follow-up with Dr. Ac. Instructions, medications, and hospital course was discussed with patient and/or family prior to discharge.

## 2024-11-07 NOTE — DISCHARGE NOTE PROVIDER - CARE PROVIDER_API CALL
Ronny Ac  Neurosurgery  [address], [address]  [place] [address]  Phone: [phone]  Fax: [phone]  Follow Up Time: 2 weeks   Ronny Ac  Neurosurgery  [address], [address]  [place] [address]  Phone: [phone]  Fax: [phone]  Follow Up Time: 1 week

## 2024-11-08 ENCOUNTER — TRANSCRIPTION ENCOUNTER (OUTPATIENT)
Age: 64
End: 2024-11-08

## 2024-11-08 VITALS
TEMPERATURE: 98 F | RESPIRATION RATE: 18 BRPM | HEART RATE: 73 BPM | OXYGEN SATURATION: 98 % | SYSTOLIC BLOOD PRESSURE: 101 MMHG | DIASTOLIC BLOOD PRESSURE: 69 MMHG

## 2024-11-08 LAB
ANION GAP SERPL CALC-SCNC: 10 MMOL/L — SIGNIFICANT CHANGE UP (ref 5–17)
BUN SERPL-MCNC: 7.2 MG/DL — LOW (ref 8–20)
CALCIUM SERPL-MCNC: 8.3 MG/DL — LOW (ref 8.4–10.5)
CHLORIDE SERPL-SCNC: 105 MMOL/L — SIGNIFICANT CHANGE UP (ref 96–108)
CO2 SERPL-SCNC: 25 MMOL/L — SIGNIFICANT CHANGE UP (ref 22–29)
CREAT SERPL-MCNC: 0.44 MG/DL — LOW (ref 0.5–1.3)
EGFR: 109 ML/MIN/1.73M2 — SIGNIFICANT CHANGE UP
GLUCOSE SERPL-MCNC: 87 MG/DL — SIGNIFICANT CHANGE UP (ref 70–99)
HCT VFR BLD CALC: 28.7 % — LOW (ref 34.5–45)
HGB BLD-MCNC: 9 G/DL — LOW (ref 11.5–15.5)
MAGNESIUM SERPL-MCNC: 1.9 MG/DL — SIGNIFICANT CHANGE UP (ref 1.6–2.6)
MCHC RBC-ENTMCNC: 28.8 PG — SIGNIFICANT CHANGE UP (ref 27–34)
MCHC RBC-ENTMCNC: 31.4 G/DL — LOW (ref 32–36)
MCV RBC AUTO: 91.7 FL — SIGNIFICANT CHANGE UP (ref 80–100)
PHOSPHATE SERPL-MCNC: 2.6 MG/DL — SIGNIFICANT CHANGE UP (ref 2.4–4.7)
PLATELET # BLD AUTO: 219 K/UL — SIGNIFICANT CHANGE UP (ref 150–400)
POTASSIUM SERPL-MCNC: 4.5 MMOL/L — SIGNIFICANT CHANGE UP (ref 3.5–5.3)
POTASSIUM SERPL-SCNC: 4.5 MMOL/L — SIGNIFICANT CHANGE UP (ref 3.5–5.3)
RBC # BLD: 3.13 M/UL — LOW (ref 3.8–5.2)
RBC # FLD: 12.7 % — SIGNIFICANT CHANGE UP (ref 10.3–14.5)
SODIUM SERPL-SCNC: 140 MMOL/L — SIGNIFICANT CHANGE UP (ref 135–145)
WBC # BLD: 7.96 K/UL — SIGNIFICANT CHANGE UP (ref 3.8–10.5)
WBC # FLD AUTO: 7.96 K/UL — SIGNIFICANT CHANGE UP (ref 3.8–10.5)

## 2024-11-08 PROCEDURE — 84466 ASSAY OF TRANSFERRIN: CPT

## 2024-11-08 PROCEDURE — 93306 TTE W/DOPPLER COMPLETE: CPT

## 2024-11-08 PROCEDURE — 99232 SBSQ HOSP IP/OBS MODERATE 35: CPT

## 2024-11-08 PROCEDURE — 93880 EXTRACRANIAL BILAT STUDY: CPT

## 2024-11-08 PROCEDURE — 97163 PT EVAL HIGH COMPLEX 45 MIN: CPT

## 2024-11-08 PROCEDURE — 76000 FLUOROSCOPY <1 HR PHYS/QHP: CPT

## 2024-11-08 PROCEDURE — 80048 BASIC METABOLIC PNL TOTAL CA: CPT

## 2024-11-08 PROCEDURE — 83550 IRON BINDING TEST: CPT

## 2024-11-08 PROCEDURE — 93005 ELECTROCARDIOGRAM TRACING: CPT

## 2024-11-08 PROCEDURE — 85025 COMPLETE CBC W/AUTO DIFF WBC: CPT

## 2024-11-08 PROCEDURE — 82728 ASSAY OF FERRITIN: CPT

## 2024-11-08 PROCEDURE — 83540 ASSAY OF IRON: CPT

## 2024-11-08 PROCEDURE — 36415 COLL VENOUS BLD VENIPUNCTURE: CPT

## 2024-11-08 PROCEDURE — 85027 COMPLETE CBC AUTOMATED: CPT

## 2024-11-08 PROCEDURE — 82962 GLUCOSE BLOOD TEST: CPT

## 2024-11-08 PROCEDURE — 83735 ASSAY OF MAGNESIUM: CPT

## 2024-11-08 PROCEDURE — C1713: CPT

## 2024-11-08 PROCEDURE — 84100 ASSAY OF PHOSPHORUS: CPT

## 2024-11-08 PROCEDURE — C1889: CPT

## 2024-11-08 RX ORDER — MAGNESIUM SULFATE IN 0.9% NACL 2 G/50 ML
1 INTRAVENOUS SOLUTION, PIGGYBACK (ML) INTRAVENOUS ONCE
Refills: 0 | Status: COMPLETED | OUTPATIENT
Start: 2024-11-08 | End: 2024-11-08

## 2024-11-08 RX ORDER — OXYCODONE HYDROCHLORIDE 30 MG/1
1 TABLET ORAL
Qty: 16 | Refills: 0
Start: 2024-11-08 | End: 2024-11-11

## 2024-11-08 RX ORDER — METHOCARBAMOL 500 MG/1
1 TABLET ORAL
Qty: 42 | Refills: 0
Start: 2024-11-08 | End: 2024-11-21

## 2024-11-08 RX ORDER — DOCUSATE SODIUM 100 MG
2 CAPSULE ORAL
Qty: 0 | Refills: 0 | DISCHARGE

## 2024-11-08 RX ORDER — METHOCARBAMOL 500 MG/1
1 TABLET ORAL
Qty: 21 | Refills: 0
Start: 2024-11-08 | End: 2024-11-14

## 2024-11-08 RX ORDER — ACETAMINOPHEN 325 MG
2 TABLET ORAL
Refills: 0 | DISCHARGE

## 2024-11-08 RX ORDER — OXYCODONE HYDROCHLORIDE 30 MG/1
1 TABLET ORAL
Qty: 28 | Refills: 0
Start: 2024-11-08 | End: 2024-11-14

## 2024-11-08 RX ORDER — ACETAMINOPHEN 500 MG
2 TABLET ORAL
Qty: 0 | Refills: 0 | DISCHARGE
Start: 2024-11-08

## 2024-11-08 RX ADMIN — Medication 650 MILLIGRAM(S): at 00:00

## 2024-11-08 RX ADMIN — FOLIC ACID 1 MILLIGRAM(S): 1 TABLET ORAL at 11:56

## 2024-11-08 RX ADMIN — Medication 650 MILLIGRAM(S): at 05:16

## 2024-11-08 RX ADMIN — GABAPENTIN 300 MILLIGRAM(S): 300 CAPSULE ORAL at 05:16

## 2024-11-08 RX ADMIN — Medication 650 MILLIGRAM(S): at 11:56

## 2024-11-08 RX ADMIN — Medication 650 MILLIGRAM(S): at 06:02

## 2024-11-08 RX ADMIN — METHOCARBAMOL 500 MILLIGRAM(S): 500 TABLET ORAL at 09:06

## 2024-11-08 RX ADMIN — Medication 100 GRAM(S): at 10:52

## 2024-11-08 RX ADMIN — Medication 40 MILLIGRAM(S): at 05:16

## 2024-11-08 NOTE — PROGRESS NOTE ADULT - SUBJECTIVE AND OBJECTIVE BOX
HPI:  Pt is a very pleasant 63 y/o female, PMH asthma as a child, anemia, presents to PST with c/o lumbar spondylolisthesis.  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  Has worked with PT, f/u with neuro for EMGs, acupuncture - all w/o relief.  Pt verbalizing wishes for surgical intervention, f/u with surgeon, recent imagining reviewed, sent for additional CT scan, now scheduled for surgery.  Pt reports feeling generally well otherwise, denies recent fever/cough/cold, infection or abx use.  Denies bowel/bladder dysfunction.  Scheduled for L4-L5 Posterior Lumbar Interbody Fusion on 11.4.2024 with Dr. Ac, pending medical clearance.  (18 Oct 2024 08:06)    INTERVAL HPI/OVERNIGHT EVENTS:  Pt is POD#4 s/p L4-L5 PLIF. Pt doing well. Denies any episodes of lightheadedness or dizziness. Is feeling well and ready to go home. Reports some mild R ankle pain but otherwise denies any complaints. PALLAVI drain d/c yesterday.    Vital Signs Last 24 Hrs  T(C): 36.8 (11-08-24 @ 08:45), Max: 36.9 (11-07-24 @ 14:34)  T(F): 98.3 (11-08-24 @ 08:45), Max: 98.4 (11-07-24 @ 14:34)  HR: 67 (11-08-24 @ 08:45) (67 - 79)  BP: 103/67 (11-08-24 @ 08:45) (91/56 - 117/68)  BP(mean): --  RR: 18 (11-08-24 @ 08:45) (16 - 18)  SpO2: 97% (11-08-24 @ 08:45) (94% - 97%)    PHYSICAL EXAM:  GENERAL: NAD  HEAD: Atraumatic, normocephalic  WOUND: Incision c/d/i, steristrips in place; two areas of linear abrasions superior to incision site; minimal blood from site, no sign of infection; covered w/ xeroform and gauze  FIORELLA COMA SCORE: E-4 V-5 M-6 = 15  MENTAL STATUS: AAO x3; Awake; Opens eyes spontaneously; Appropriately conversant without aphasia; following commands  CRANIAL NERVES: PERRL. Facial sensation intact V1-3 distribution b/l. Face symmetric w/ normal eye closure and smile, tongue midline. Hearing grossly intact. Speech clear.  MOTOR: strength 5/5 b/l upper and lower extremities  SENSATION: grossly intact to light touch all extremities  CHEST/LUNG: Nonlabored breathing  SKIN: Warm, dry      LABS:                      9.0    7.96  )-----------( 219      ( 08 Nov 2024 05:25 )             28.7     11-08    140  |  105  |  7.2[L]  ----------------------------<  87  4.5   |  25.0  |  0.44[L]    Ca    8.3[L]      08 Nov 2024 05:25  Phos  2.6     11-08  Mg     1.9     11-08        Urinalysis Basic - ( 08 Nov 2024 05:25 )    Color: x / Appearance: x / SG: x / pH: x  Gluc: 87 mg/dL / Ketone: x  / Bili: x / Urobili: x   Blood: x / Protein: x / Nitrite: x   Leuk Esterase: x / RBC: x / WBC x   Sq Epi: x / Non Sq Epi: x / Bacteria: x    I&O's Summary    07 Nov 2024 07:01  -  08 Nov 2024 07:00  --------------------------------------------------------  IN: 500 mL / OUT: 800 mL / NET: -300 mL        RADIOLOGY & ADDITIONAL TESTS:    US Duplex Carotid Arteries Complete, Bilateral (11.07.24 @ 16:02)   IMPRESSION:    No significant hemodynamic stenosis of either carotid artery.    TTE W or WO Ultrasound Enhancing Agent (11.06.24 @ 16:37)   CONCLUSIONS:      1. Left ventricular cavity is normal in size. Left ventricular wall thickness is normal. Left ventricular systolic function is normal with an ejection fraction visually estimated at 60 to 65 %.   2. Normal left ventricular diastolic function.   3. Normal right ventricular cavity size and normal right ventricular systolic function.   4. Left atrium is normal in size.   5. The right atrium is normal in size.   6. Trace mitral regurgitation.   7. Trileaflet aortic valve with normal systolic excursion. There is mild calcification of the aortic valve leaflets.   8. Mild tricuspid regurgitation.   9. No pericardial effusion seen.  10. Estimated pulmonary artery systolic pressure is 19 mmHg, consistent with normal pulmonary artery pressure.

## 2024-11-08 NOTE — PROGRESS NOTE ADULT - PROVIDER SPECIALTY LIST ADULT
Cardiology
Neurosurgery
Neurosurgery
Hospitalist
Neurosurgery
Hospitalist

## 2024-11-08 NOTE — PROGRESS NOTE ADULT - ASSESSMENT
64 year old female with childhood asthma, anemia and spondylolisthesis presented with bilateral lower extremity pain and underwent L4-L5 PLIF     # Lumbar Spondylolisthesis s/p L4-L5 PLIF (11/4/24); Complicated by  # Anemia, normocytic, Acute blood loss. Mild downwards trend due to hemodilution from fluid for hypotension, Hb is stable, told to continue with iron supplements   # Iron Deficiency  # Hypotension, Post-operative. Orthostatics improved with hydration  # Constipation    - Neurologic monitoring  - Antibiotics as per SCIP  - Wound care and drain management as per primary team  - PT.    - Bowel regimen (Senna, Miralax and home Metamucil). Add Bisacodyl suppository daily PRN  - Monitor Hgb; PRBC if <7  - IV (Venofer 200mg x another dose today) as patient doesn't want to take oral Iron or MVI. Folic Acid.   - Hydration, oral and IV  - Incentive Spirometry  - VTE prophylaxis as per primary team    Discussed with Neurosurgery PA.     Patient is stable from the medicine point of view for discharge pending PT and Neurosurgery eval.

## 2024-11-08 NOTE — PROGRESS NOTE ADULT - ASSESSMENT
63 y/o female, PMH asthma as a child, anemia, presents to PST with c/o lumbar spondylolisthesis. POD#4 s/p L4-L5 PLIF with Dr. Ac.    PLAN:    - Q4 neuro checks while in house  - Pain well controlled with pain regimen: Tylenol 650q6 standing, oxy 5/10 PRN, Robaxin PRN; per pt only taking tylenol and robaxin w/ good pain control  - Cardiology consulted for Syncope workup: ECHO negative, carotid US negative, will f/u OP for MCOT  - LSO when OOB   - Dressing replaced on skin abrasions superior to incision site  - LBM: 11/7  - Benadryl PRN due to patient's adhesive irritation  - DVT ppx: SCDS, Lovenox  - Activity: PT/OT  - Plan to d/c home today  - Case discussed with Dr. Ac 63 y/o female, PMH asthma as a child, anemia, presents to PST with c/o lumbar spondylolisthesis. POD#4 s/p L4-L5 PLIF with Dr. Ac.    PLAN:    - Q4 neuro checks while in house  - Pain well controlled with pain regimen: Tylenol 650q6 standing, oxy 5/10 PRN, Robaxin PRN; per pt only taking tylenol and robaxin w/ good pain control  - Cardiology consulted for Syncope workup: ECHO negative, carotid US negative, will f/u OP for MCOT  - LSO when OOB   - Dressing replaced on skin abrasions superior to incision site  - LBM: 11/7  - Benadryl PRN due to patient's adhesive irritation  - Mild hypomagnesemia, supplemented  - DVT ppx: SCDS, Lovenox  - Activity: PT/OT  - Plan to d/c home today  - Case discussed with Dr. Ac

## 2024-11-08 NOTE — PROGRESS NOTE ADULT - PROBLEM SELECTOR PROBLEM 1
Spondylolisthesis, lumbar region
Near syncope

## 2024-11-08 NOTE — PROGRESS NOTE ADULT - SUBJECTIVE AND OBJECTIVE BOX
IQRA FAJARDO    [id number]    64y      Female    Patient is a 64y old  Female who presents with a chief complaint of elective spine surgery (07 Nov 2024 10:29)      INTERVAL HPI/OVERNIGHT EVENTS:    Patient is doing ok, denies dizziness, has no fever, chills, chest pain, sob     Vital Signs Last 24 Hrs  T(C): 36.8 (08 Nov 2024 08:45), Max: 36.9 (07 Nov 2024 14:34)  T(F): 98.3 (08 Nov 2024 08:45), Max: 98.4 (07 Nov 2024 14:34)  HR: 67 (08 Nov 2024 08:45) (67 - 79)  BP: 103/67 (08 Nov 2024 08:45) (91/56 - 117/68)  RR: 18 (08 Nov 2024 08:45) (16 - 18)  SpO2: 97% (08 Nov 2024 08:45) (94% - 97%)    Parameters below as of 08 Nov 2024 08:45  Patient On (Oxygen Delivery Method): room air        PHYSICAL EXAM:    GENERAL: Middle age male looking comfortable    HEENT: PERRL, +EOMI  NECK: soft, Supple, No JVD  CHEST/LUNG: Clear to auscultate bilaterally; No wheezing  HEART: S1S2+, Regular rate and rhythm; No murmurs  ABDOMEN: Soft, Nontender, Nondistended; Bowel sounds present  EXTREMITIES:  1+ Peripheral Pulses, No edema  SKIN: No rashes or lesions  NEURO: AAOX3  PSYCH: normal mood  Lower back: clean dressings on       LABS:                        9.0    7.96  )-----------( 219      ( 08 Nov 2024 05:25 )             28.7     11-08    140  |  105  |  7.2[L]  ----------------------------<  87  4.5   |  25.0  |  0.44[L]    Ca    8.3[L]      08 Nov 2024 05:25  Phos  2.6     11-08  Mg     1.9     11-08          I&O's Summary    07 Nov 2024 07:01  -  08 Nov 2024 07:00  --------------------------------------------------------  IN: 500 mL / OUT: 800 mL / NET: -300 mL        MEDICATIONS  (STANDING):  acetaminophen     Tablet .. 650 milliGRAM(s) Oral every 6 hours  enoxaparin Injectable 40 milliGRAM(s) SubCutaneous every 24 hours  folic acid 1 milliGRAM(s) Oral daily  gabapentin 300 milliGRAM(s) Oral three times a day  magnesium sulfate  IVPB 1 Gram(s) IV Intermittent once  polyethylene glycol 3350 17 Gram(s) Oral at bedtime  senna 2 Tablet(s) Oral at bedtime  sodium chloride 0.9%. 1000 milliLiter(s) (70 mL/Hr) IV Continuous <Continuous>    MEDICATIONS  (PRN):  bisacodyl Suppository 10 milliGRAM(s) Rectal daily PRN Constipation  diphenhydrAMINE 50 milliGRAM(s) Oral every 6 hours PRN Rash and/or Itching  methocarbamol 500 milliGRAM(s) Oral three times a day PRN Muscle Spasm  naloxone Injectable 0.1 milliGRAM(s) IV Push every 3 minutes PRN For ANY of the following changes in patient status:  A. RR LESS THAN 10 breaths per minute, B. Oxygen saturation LESS THAN 90%, C. Sedation score of 6  ondansetron Injectable 4 milliGRAM(s) IV Push every 6 hours PRN Nausea  oxyCODONE    IR 5 milliGRAM(s) Oral every 4 hours PRN Moderate Pain (4 - 6)  oxyCODONE    IR 10 milliGRAM(s) Oral every 4 hours PRN Severe Pain (7 - 10)

## 2024-11-08 NOTE — DISCHARGE NOTE NURSING/CASE MANAGEMENT/SOCIAL WORK - NSDCFUADDAPPT_GEN_ALL_CORE_FT
Please schedule a followup with your primary care provider in the next 1-2 weeks to be re-evaluated for your anemia  Please also schedule a followup with a cardiologist given your episodes of near vasovagal syncope post operatively. Our cardiology team recommends a consideration for Mobile Cardiac Outpatient Telemetry (MCOT) on an outpatient basis.

## 2024-11-08 NOTE — DISCHARGE NOTE NURSING/CASE MANAGEMENT/SOCIAL WORK - FINANCIAL ASSISTANCE
[facility] provides services at a reduced cost to those who are determined to be eligible through [facility]’s financial assistance program. Information regarding [facility]’s financial assistance program can be found by going to [email or web address] or by calling [phone].

## 2024-11-08 NOTE — DISCHARGE NOTE NURSING/CASE MANAGEMENT/SOCIAL WORK - PATIENT PORTAL LINK FT
You can access the FollowMyHealth Patient Portal offered by [facility] by registering at the following website: [email or web address]. By joining [employer]’s FollowMyHealth portal, you will also be able to view your health information using other applications (apps) compatible with our system.

## 2025-02-21 ENCOUNTER — APPOINTMENT (OUTPATIENT)
Dept: ORTHOPEDIC SURGERY | Facility: CLINIC | Age: 65
End: 2025-02-21

## (undated) DEVICE — MIDAS REX MR8 MATCH HEAD FLUTED SM BORE 3MM X 10CM

## (undated) DEVICE — MARKING PEN W RULER

## (undated) DEVICE — DRSG TEGADERM 4X4.75"

## (undated) DEVICE — SPONGE PEANUT AUTO COUNT

## (undated) DEVICE — WARMING BLANKET UPPER ADULT

## (undated) DEVICE — POSITIONER JACKSON TABLE PRONEVIEW CUSHION, CHEST, HIP, THIGH PADS

## (undated) DEVICE — DRAPE C ARM UNIVERSAL

## (undated) DEVICE — FOLEY TRAY 16FR LF URINE METER SURESTEP

## (undated) DEVICE — DRAPE INSTRUMENT POUCH 6.75" X 11"

## (undated) DEVICE — ELCTR BOVIE PENCIL BLADE 10FT

## (undated) DEVICE — DRAPE SPLIT SHEET 77" X 108"

## (undated) DEVICE — PACK NEURO

## (undated) DEVICE — TUBING FOR SMOKE EVACUATOR (PURPLE END)

## (undated) DEVICE — FRAZIER SUCTION TIP 10FR

## (undated) DEVICE — PREP DURAPREP 26CC

## (undated) DEVICE — SUT VICRYL PLUS 2-0 18" CP-2 UNDYED (POP-OFF)

## (undated) DEVICE — POSITIONER CUSHION INSERT PRONE VIEW LG

## (undated) DEVICE — SUT MONOCRYL 3-0 27" PS-2 UNDYED

## (undated) DEVICE — BIPOLAR FORCEP SYMMETRY BAYONET 7" X 1.5MM SMOOTH (SILVER)

## (undated) DEVICE — GLV 7.5 PROTEXIS (WHITE)

## (undated) DEVICE — FRAZIER CONNECTING TUBE 2FT 5MM

## (undated) DEVICE — STRYKER LITE BIO DELIVERY SYSTEM WITH CANNULA

## (undated) DEVICE — TUBING BIPOLAR IRRIGATOR AND CORD SET

## (undated) DEVICE — POSITIONER FOAM LAMINECTOMY ARM CRADLE (PINK)

## (undated) DEVICE — POSITIONER FOAM EGG CRATE ULNAR 2PCS (PINK)

## (undated) DEVICE — DRAPE TOWEL BLUE 17" X 24"

## (undated) DEVICE — VENODYNE/SCD SLEEVE CALF MEDIUM

## (undated) DEVICE — WARMING BLANKET LOWER ADULT

## (undated) DEVICE — DRSG TELFA 3 X 4

## (undated) DEVICE — STAPLER SKIN PROXIMATE

## (undated) DEVICE — SOL IRR POUR H2O 1000ML

## (undated) DEVICE — ELCTR BOVIE TIP NEEDLE INSULATED 6.5" EDGE

## (undated) DEVICE — SUT VICRYL PLUS 0 18" CT-1 UNDYED (POP-OFF)

## (undated) DEVICE — DRAIN JACKSON PRATT 7MM FLAT FULL W 15 FR TROCAR

## (undated) DEVICE — DRSG STERISTRIPS 0.5 X 4"

## (undated) DEVICE — DRSG MASTISOL

## (undated) DEVICE — SUT SILK 2-0 18" SH (POP-OFF)

## (undated) DEVICE — SOL IRR POUR NS 0.9% 1000ML

## (undated) DEVICE — ELCTR GROUNDING PAD ADULT COVIDIEN

## (undated) DEVICE — Device